# Patient Record
Sex: FEMALE | Race: WHITE | NOT HISPANIC OR LATINO | Employment: OTHER | URBAN - METROPOLITAN AREA
[De-identification: names, ages, dates, MRNs, and addresses within clinical notes are randomized per-mention and may not be internally consistent; named-entity substitution may affect disease eponyms.]

---

## 2017-08-09 ENCOUNTER — TELEPHONE (OUTPATIENT)
Dept: UROLOGY | Facility: CLINIC | Age: 67
End: 2017-08-09

## 2017-08-09 NOTE — TELEPHONE ENCOUNTER
Spoke to pt and we are going to get her records, she has a stent and a kidney stone. She was seeing dr mandujano and she wants to now see dr tejeda. She still has a stone that has not beed broken up. She had her surgery and imaging done at [facility]. Will get records.

## 2017-08-09 NOTE — TELEPHONE ENCOUNTER
----- Message from Izaiah Sade sent at 8/9/2017 10:52 AM CDT -----  Contact: Patient  Patient states that she had made an appointment with Dr London for August 25th but there was a procedure that was going to be done by another doctor and it was scheduled for tomorrow..  It was postponed.  The patient would like to be seen sooner.   Can you please call the patient back at [phone].  Thank you

## 2017-08-23 ENCOUNTER — TELEPHONE (OUTPATIENT)
Dept: UROLOGY | Facility: CLINIC | Age: 67
End: 2017-08-23

## 2017-08-23 RX ORDER — LANCETS
EACH MISCELLANEOUS
Refills: 6 | COMMUNITY
Start: 2017-08-16 | End: 2023-09-15

## 2017-08-23 NOTE — TELEPHONE ENCOUNTER
----- Message from Roshni Dunaway sent at 8/23/2017 12:47 PM CDT -----  Reschedule appointment.  Call [phone].

## 2017-08-24 ENCOUNTER — TELEPHONE (OUTPATIENT)
Dept: UROLOGY | Facility: CLINIC | Age: 67
End: 2017-08-24

## 2017-08-24 NOTE — TELEPHONE ENCOUNTER
Patient called to confirm an appointment with Dr. London. She would like to be on a cancellation list.

## 2017-09-07 ENCOUNTER — OFFICE VISIT (OUTPATIENT)
Dept: UROLOGY | Facility: CLINIC | Age: 67
End: 2017-09-07
Payer: MEDICARE

## 2017-09-07 ENCOUNTER — HOSPITAL ENCOUNTER (OUTPATIENT)
Dept: RADIOLOGY | Facility: HOSPITAL | Age: 67
Discharge: HOME | End: 2017-09-07
Attending: UROLOGY
Payer: MEDICARE

## 2017-09-07 VITALS
BODY MASS INDEX: 26.08 KG/M2 | WEIGHT: 162.25 LBS | SYSTOLIC BLOOD PRESSURE: 130 MMHG | DIASTOLIC BLOOD PRESSURE: 74 MMHG | HEIGHT: 66 IN | HEART RATE: 88 BPM

## 2017-09-07 DIAGNOSIS — N20.1 URETERAL STONE: ICD-10-CM

## 2017-09-07 DIAGNOSIS — N20.0 RENAL STONE: Primary | ICD-10-CM

## 2017-09-07 DIAGNOSIS — N20.0 RENAL STONE: ICD-10-CM

## 2017-09-07 DIAGNOSIS — R31.29 HEMATURIA, MICROSCOPIC: ICD-10-CM

## 2017-09-07 LAB
BILIRUB SERPL-MCNC: NEGATIVE MG/DL
BLOOD URINE, POC: 50
COLOR, POC UA: YELLOW
GLUCOSE UR QL STRIP: 100
KETONES UR QL STRIP: NEGATIVE
LEUKOCYTE ESTERASE URINE, POC: NORMAL
NITRITE, POC UA: NEGATIVE
PH, POC UA: 5
PROTEIN, POC: NORMAL
SPECIFIC GRAVITY, POC UA: 1.01
UROBILINOGEN, POC UA: NEGATIVE

## 2017-09-07 PROCEDURE — 99999 PR PBB SHADOW E&M-EST. PATIENT-LVL III: CPT | Mod: PBBFAC,,, | Performed by: UROLOGY

## 2017-09-07 PROCEDURE — 99213 OFFICE O/P EST LOW 20 MIN: CPT | Mod: PBBFAC,25,PO | Performed by: UROLOGY

## 2017-09-07 PROCEDURE — 81002 URINALYSIS NONAUTO W/O SCOPE: CPT | Mod: PBBFAC,PO | Performed by: UROLOGY

## 2017-09-07 PROCEDURE — 1126F AMNT PAIN NOTED NONE PRSNT: CPT | Mod: ,,, | Performed by: UROLOGY

## 2017-09-07 PROCEDURE — 74000 XR ABDOMEN AP 1 VIEW: CPT | Mod: 26,,, | Performed by: RADIOLOGY

## 2017-09-07 PROCEDURE — 99204 OFFICE O/P NEW MOD 45 MIN: CPT | Mod: S$PBB,,, | Performed by: UROLOGY

## 2017-09-07 PROCEDURE — 1159F MED LIST DOCD IN RCRD: CPT | Mod: ,,, | Performed by: UROLOGY

## 2017-09-07 PROCEDURE — 74000 XR ABDOMEN AP 1 VIEW: CPT | Mod: TC,PO

## 2017-09-07 RX ORDER — CETIRIZINE HYDROCHLORIDE 10 MG/1
10 TABLET ORAL DAILY PRN
COMMUNITY
End: 2017-09-11

## 2017-09-07 RX ORDER — METFORMIN HYDROCHLORIDE 1000 MG/1
1000 TABLET ORAL 2 TIMES DAILY
COMMUNITY
Start: 2017-06-28 | End: 2023-09-15

## 2017-09-07 RX ORDER — ALBUTEROL SULFATE 90 UG/1
2 AEROSOL, METERED RESPIRATORY (INHALATION) EVERY 6 HOURS PRN
COMMUNITY
Start: 2015-02-03

## 2017-09-07 RX ORDER — MULTIVITAMIN
1 TABLET ORAL DAILY
COMMUNITY
End: 2023-09-15

## 2017-09-07 RX ORDER — GLIPIZIDE 10 MG/1
10 TABLET ORAL DAILY
COMMUNITY
Start: 2017-08-15 | End: 2023-09-15

## 2017-09-07 RX ORDER — LANCETS
1 EACH MISCELLANEOUS 2 TIMES DAILY
COMMUNITY
Start: 2017-06-26 | End: 2023-09-15

## 2017-09-07 NOTE — PROGRESS NOTES
Subjective:       Patient ID: Aida Virk is a 67 y.o. female.    Chief Complaint: No chief complaint on file.    HPI     67 year old with a left kidney/ureteral stone.  She was initially seen in the ER at [facility] with left flank pain with associated nausea and fever.  She was seen by Dr. Elias and a stent was placed on 6/24/17 and she was treated with antibiotics.  She then underwent Left ESWL on 7/19.  She is here is [facility] care.  She still has the stent.  She has no complaints today.  She denies hematuria and dysuria.  This was her first lifetime stone.  She has no recent imaging.    History reviewed. No pertinent past medical history.    History reviewed. No pertinent surgical history.      Current Outpatient Prescriptions:     ACCU-CHEK SOFTCLIX LANCETS Misc, TEST TID UTD, Disp: , Rfl: 6    albuterol 90 mcg/actuation inhaler, Inhale 2 puffs into the lungs every 6 (six) hours as needed., Disp: , Rfl:     blood sugar diagnostic (ACCU-CHEK RUSTY PLUS TEST STRP) Strp, 1 strip 3 (three) times daily., Disp: , Rfl:     cetirizine (ZYRTEC) 10 MG tablet, Take 10 mg by mouth daily as needed for Allergies., Disp: , Rfl:     glipiZIDE (GLUCOTROL) 10 MG tablet, Take 10 mg by mouth once daily., Disp: , Rfl:     lancets (ACCU-CHEK SOFTCLIX LANCETS) Misc, 1 each by Other route 2 (two) times daily., Disp: , Rfl:     metformin (GLUCOPHAGE) 1000 MG tablet, Take 1,000 mg by mouth 2 (two) times daily., Disp: , Rfl:     multivitamin (ONE DAILY MULTIVITAMIN) per tablet, Take 1 tablet by mouth once daily., Disp: , Rfl:     Review of Systems   Constitutional: Negative for fever.   Eyes: Negative for visual disturbance.   Respiratory: Negative for shortness of breath.    Cardiovascular: Negative for chest pain.   Gastrointestinal: Negative for nausea.   Genitourinary: Negative for dysuria, flank pain and hematuria.   Musculoskeletal: Negative for gait problem.   Skin: Negative for rash.   Neurological: Negative for  seizures.   Psychiatric/Behavioral: Negative for confusion.       Objective:      Physical Exam   Constitutional: She is oriented to person, place, and time. She appears well-developed and well-nourished.   HENT:   Head: Normocephalic.   Eyes: Conjunctivae and EOM are normal.   Neck: Normal range of motion.   Cardiovascular: Normal rate.    Pulmonary/Chest: Effort normal.   Abdominal: Soft. She exhibits no distension and no mass. There is no tenderness. There is no CVA tenderness.   Genitourinary:   Genitourinary Comments: Bladder non-tender and nondistended  No CVA tenderness   Musculoskeletal: She exhibits no edema.   Neurological: She is alert and oriented to person, place, and time.   Skin: Skin is warm and dry. No rash noted. No erythema.   Psychiatric: She has a normal mood and affect. Her behavior is normal.   Vitals reviewed.      Assessment:       1. Renal stone    2. Ureteral stone    3. Hematuria, microscopic        Plan:       Renal stone  -     POCT URINE DIPSTICK WITHOUT MICROSCOPE  -     X-Ray Abdomen AP 1 View; Future; Expected date: 09/07/2017    Ureteral stone    Hematuria, microscopic        I reviewed the KUB.  There is a large calcification over the left renal shadow which is a gallstone seen on CT scan.  Another calcification is seen along the distal stent which I think corresponds to a phlebolith seen on CT scan although difficult to know for sure.  I recommend removing stent and observation.

## 2017-09-11 ENCOUNTER — PROCEDURE VISIT (OUTPATIENT)
Dept: UROLOGY | Facility: CLINIC | Age: 67
End: 2017-09-11
Payer: MEDICARE

## 2017-09-11 ENCOUNTER — TELEPHONE (OUTPATIENT)
Dept: UROLOGY | Facility: CLINIC | Age: 67
End: 2017-09-11

## 2017-09-11 VITALS
HEIGHT: 66 IN | BODY MASS INDEX: 25.86 KG/M2 | DIASTOLIC BLOOD PRESSURE: 75 MMHG | HEART RATE: 87 BPM | WEIGHT: 160.94 LBS | SYSTOLIC BLOOD PRESSURE: 121 MMHG

## 2017-09-11 DIAGNOSIS — N20.1 URETERAL STONE: ICD-10-CM

## 2017-09-11 DIAGNOSIS — N20.0 KIDNEY STONE: ICD-10-CM

## 2017-09-11 DIAGNOSIS — R31.9 HEMATURIA: ICD-10-CM

## 2017-09-11 DIAGNOSIS — N20.0 KIDNEY STONE: Primary | ICD-10-CM

## 2017-09-11 LAB
BILIRUB SERPL-MCNC: ABNORMAL MG/DL
BLOOD URINE, POC: ABNORMAL
COLOR, POC UA: YELLOW
GLUCOSE UR QL STRIP: 100
KETONES UR QL STRIP: ABNORMAL
LEUKOCYTE ESTERASE URINE, POC: ABNORMAL
NITRITE, POC UA: ABNORMAL
PH, POC UA: 5
PROTEIN, POC: ABNORMAL
SPECIFIC GRAVITY, POC UA: 1
UROBILINOGEN, POC UA: ABNORMAL

## 2017-09-11 PROCEDURE — 52000 CYSTOURETHROSCOPY: CPT | Mod: PBBFAC,PO | Performed by: UROLOGY

## 2017-09-11 PROCEDURE — 81002 URINALYSIS NONAUTO W/O SCOPE: CPT | Mod: PBBFAC,PO | Performed by: UROLOGY

## 2017-09-11 RX ORDER — FLUTICASONE PROPIONATE AND SALMETEROL 250; 50 UG/1; UG/1
1 POWDER RESPIRATORY (INHALATION)
Status: ON HOLD | COMMUNITY
Start: 2015-02-03 | End: 2017-10-03 | Stop reason: CLARIF

## 2017-09-11 RX ORDER — CLOBETASOL PROPIONATE 0.5 MG/G
CREAM TOPICAL
Status: ON HOLD | COMMUNITY
Start: 2014-12-18 | End: 2017-10-03 | Stop reason: CLARIF

## 2017-09-11 RX ORDER — CETIRIZINE HYDROCHLORIDE 10 MG/1
10 TABLET, CHEWABLE ORAL
COMMUNITY
End: 2017-09-11

## 2017-09-11 NOTE — PROGRESS NOTES
Scheduled on 10/3/17 in [facility] for Cysto, Right Retrogrades, Right Ureteroscopy, Stent Retrieval & Possible Holmium Laser of stone.

## 2017-09-11 NOTE — TELEPHONE ENCOUNTER
Spoke to pt and confirmed adding her for today for the stent removal here in the office. She verbalized understanding.

## 2017-09-11 NOTE — PROCEDURES
"Cystoscopy  Date/Time: 9/11/2017 4:53 PM  Performed by: JUAN JOSÉ PAIGE  Authorized by: JUAN JOSÉ PAIGE     Consent Done?:  Yes (Written)  Time out: Immediately prior to procedure a "time out" was called to verify the correct patient, procedure, equipment, support staff and site/side marked as required.    Indications comment:  History of kidney stones  Position:  Other  Anesthesia:  Lidocaine jelly  Patient sedated?: No    Preparation: Patient was prepped and draped in usual sterile fashion      Scope type:  Flexible cystoscope    Patient tolerance:  Patient tolerated the procedure well with no immediate complications        The patients clinic history and physical were reviewed and there are no changes.      Procedure  Cystoscopy with attempted stent removal    The patient was placed in the frog-leg position.  The genitals were prepped and draped sterily.   Viscous lidocaine jelly was instilled into the urethra.  Using a flexible scope, a careful cystoscopic, exam was then performed.  The entire bladder mucosa was systematically visualized.  The mucosa appeared normal.   The right ureteral orifices was visualized.  The distal curl of the stent was not seen as it had retracted into the ureter.  The grasping forceps into the distal ureter but I was unable to identify and grasp the stent.  The procedure was then termintated.  She tolerated the procedure well.  There were no complications.    Plan:  Ureteroscopy with stent removal.    "

## 2017-09-22 DIAGNOSIS — N20.0 KIDNEY STONES: Primary | ICD-10-CM

## 2017-09-27 ENCOUNTER — TELEPHONE (OUTPATIENT)
Dept: UROLOGY | Facility: CLINIC | Age: 67
End: 2017-09-27

## 2017-09-27 RX ORDER — HYDROCODONE BITARTRATE AND ACETAMINOPHEN 5; 325 MG/1; MG/1
1 TABLET ORAL EVERY 6 HOURS PRN
Qty: 20 TABLET | Refills: 0 | Status: SHIPPED | OUTPATIENT
Start: 2017-09-27 | End: 2023-09-15

## 2017-09-27 NOTE — TELEPHONE ENCOUNTER
Spoke to pt and she is having pain that the tylenol is not helping. Pt would like to be prescribed a pain medication. Please send to [facility].

## 2017-09-27 NOTE — TELEPHONE ENCOUNTER
----- Message from Emil Morales sent at 9/27/2017  4:00 PM CDT -----  Contact: patient  Place call to pod,patient called stated that she has issues with kidneys in pain.don't know if she can wait for surgery.requesting a call back at [phone]. Thanks,

## 2017-10-02 ENCOUNTER — ANESTHESIA EVENT (OUTPATIENT)
Dept: SURGERY | Facility: HOSPITAL | Age: 67
End: 2017-10-02
Payer: MEDICARE

## 2017-10-03 ENCOUNTER — SURGERY (OUTPATIENT)
Age: 67
End: 2017-10-03

## 2017-10-03 ENCOUNTER — HOSPITAL ENCOUNTER (OUTPATIENT)
Dept: RADIOLOGY | Facility: HOSPITAL | Age: 67
Discharge: HOME | End: 2017-10-03
Attending: UROLOGY | Admitting: UROLOGY
Payer: MEDICARE

## 2017-10-03 ENCOUNTER — TELEPHONE (OUTPATIENT)
Dept: UROLOGY | Facility: CLINIC | Age: 67
End: 2017-10-03

## 2017-10-03 ENCOUNTER — ANESTHESIA (OUTPATIENT)
Dept: SURGERY | Facility: HOSPITAL | Age: 67
End: 2017-10-03
Payer: MEDICARE

## 2017-10-03 ENCOUNTER — HOSPITAL ENCOUNTER (OUTPATIENT)
Facility: HOSPITAL | Age: 67
Discharge: HOME | End: 2017-10-03
Attending: UROLOGY | Admitting: UROLOGY
Payer: MEDICARE

## 2017-10-03 VITALS
TEMPERATURE: 98 F | DIASTOLIC BLOOD PRESSURE: 72 MMHG | SYSTOLIC BLOOD PRESSURE: 113 MMHG | HEIGHT: 66 IN | BODY MASS INDEX: 24.91 KG/M2 | RESPIRATION RATE: 18 BRPM | WEIGHT: 155 LBS | OXYGEN SATURATION: 98 % | HEART RATE: 84 BPM

## 2017-10-03 DIAGNOSIS — N20.1 RIGHT URETERAL STONE: Primary | ICD-10-CM

## 2017-10-03 DIAGNOSIS — N20.0 KIDNEY STONES: ICD-10-CM

## 2017-10-03 DIAGNOSIS — N20.1 URETERAL STONE: ICD-10-CM

## 2017-10-03 DIAGNOSIS — N20.0 KIDNEY STONE: ICD-10-CM

## 2017-10-03 LAB — GLUCOSE SERPL-MCNC: 177 MG/DL (ref 70–110)

## 2017-10-03 PROCEDURE — 36000706: Mod: PO | Performed by: UROLOGY

## 2017-10-03 PROCEDURE — 82962 GLUCOSE BLOOD TEST: CPT | Mod: PO | Performed by: UROLOGY

## 2017-10-03 PROCEDURE — 25000003 PHARM REV CODE 250: Mod: PO | Performed by: NURSE ANESTHETIST, CERTIFIED REGISTERED

## 2017-10-03 PROCEDURE — 27200651 HC AIRWAY, LMA: Mod: PO | Performed by: NURSE ANESTHETIST, CERTIFIED REGISTERED

## 2017-10-03 PROCEDURE — D9220A PRA ANESTHESIA: Mod: ANES,,, | Performed by: ANESTHESIOLOGY

## 2017-10-03 PROCEDURE — 63600175 PHARM REV CODE 636 W HCPCS: Mod: PO | Performed by: UROLOGY

## 2017-10-03 PROCEDURE — D9220A PRA ANESTHESIA: Mod: CRNA,,, | Performed by: NURSE ANESTHETIST, CERTIFIED REGISTERED

## 2017-10-03 PROCEDURE — 25000242 PHARM REV CODE 250 ALT 637 W/ HCPCS: Mod: PO | Performed by: ANESTHESIOLOGY

## 2017-10-03 PROCEDURE — 25000003 PHARM REV CODE 250: Mod: PO | Performed by: ANESTHESIOLOGY

## 2017-10-03 PROCEDURE — 76000 FLUOROSCOPY <1 HR PHYS/QHP: CPT | Mod: TC,PO

## 2017-10-03 PROCEDURE — 76000 FLUOROSCOPY <1 HR PHYS/QHP: CPT | Mod: 26,59,, | Performed by: UROLOGY

## 2017-10-03 PROCEDURE — 82365 CALCULUS SPECTROSCOPY: CPT

## 2017-10-03 PROCEDURE — C2617 STENT, NON-COR, TEM W/O DEL: HCPCS | Mod: PO | Performed by: UROLOGY

## 2017-10-03 PROCEDURE — 63600175 PHARM REV CODE 636 W HCPCS: Mod: PO | Performed by: NURSE ANESTHETIST, CERTIFIED REGISTERED

## 2017-10-03 PROCEDURE — 36000707: Mod: PO | Performed by: UROLOGY

## 2017-10-03 PROCEDURE — C1758 CATHETER, URETERAL: HCPCS | Mod: PO | Performed by: UROLOGY

## 2017-10-03 PROCEDURE — 37000008 HC ANESTHESIA 1ST 15 MINUTES: Mod: PO | Performed by: UROLOGY

## 2017-10-03 PROCEDURE — 63600175 PHARM REV CODE 636 W HCPCS: Mod: PO | Performed by: ANESTHESIOLOGY

## 2017-10-03 PROCEDURE — 27201423 OPTIME MED/SURG SUP & DEVICES STERILE SUPPLY: Mod: PO | Performed by: UROLOGY

## 2017-10-03 PROCEDURE — 25000003 PHARM REV CODE 250: Mod: PO | Performed by: UROLOGY

## 2017-10-03 PROCEDURE — 37000009 HC ANESTHESIA EA ADD 15 MINS: Mod: PO | Performed by: UROLOGY

## 2017-10-03 PROCEDURE — 52356 CYSTO/URETERO W/LITHOTRIPSY: CPT | Mod: RT,,, | Performed by: UROLOGY

## 2017-10-03 PROCEDURE — 71000033 HC RECOVERY, INTIAL HOUR: Mod: PO | Performed by: UROLOGY

## 2017-10-03 PROCEDURE — 25500020 PHARM REV CODE 255: Mod: PO | Performed by: UROLOGY

## 2017-10-03 PROCEDURE — 74420 UROGRAPHY RTRGR +-KUB: CPT | Mod: 26,,, | Performed by: UROLOGY

## 2017-10-03 PROCEDURE — C1769 GUIDE WIRE: HCPCS | Mod: PO | Performed by: UROLOGY

## 2017-10-03 DEVICE — STENT URETERAL UNIV 6FR 24CM: Type: IMPLANTABLE DEVICE | Site: URETER | Status: FUNCTIONAL

## 2017-10-03 RX ORDER — MIDAZOLAM HYDROCHLORIDE 1 MG/ML
INJECTION, SOLUTION INTRAMUSCULAR; INTRAVENOUS
Status: DISCONTINUED | OUTPATIENT
Start: 2017-10-03 | End: 2017-10-03

## 2017-10-03 RX ORDER — SODIUM CHLORIDE, SODIUM LACTATE, POTASSIUM CHLORIDE, CALCIUM CHLORIDE 600; 310; 30; 20 MG/100ML; MG/100ML; MG/100ML; MG/100ML
INJECTION, SOLUTION INTRAVENOUS CONTINUOUS
Status: DISCONTINUED | OUTPATIENT
Start: 2017-10-03 | End: 2017-10-03 | Stop reason: HOSPADM

## 2017-10-03 RX ORDER — LIDOCAINE HYDROCHLORIDE 10 MG/ML
1 INJECTION, SOLUTION EPIDURAL; INFILTRATION; INTRACAUDAL; PERINEURAL ONCE
Status: DISCONTINUED | OUTPATIENT
Start: 2017-10-03 | End: 2017-10-03 | Stop reason: HOSPADM

## 2017-10-03 RX ORDER — LEVALBUTEROL 1.25 MG/.5ML
1.25 SOLUTION, CONCENTRATE RESPIRATORY (INHALATION) ONCE
Status: DISCONTINUED | OUTPATIENT
Start: 2017-10-03 | End: 2017-10-03 | Stop reason: HOSPADM

## 2017-10-03 RX ORDER — LEVALBUTEROL 1.25 MG/.5ML
1.25 SOLUTION, CONCENTRATE RESPIRATORY (INHALATION) ONCE
Status: COMPLETED | OUTPATIENT
Start: 2017-10-03 | End: 2017-10-03

## 2017-10-03 RX ORDER — KETAMINE HYDROCHLORIDE 100 MG/ML
INJECTION, SOLUTION INTRAMUSCULAR; INTRAVENOUS
Status: DISCONTINUED | OUTPATIENT
Start: 2017-10-03 | End: 2017-10-03

## 2017-10-03 RX ORDER — SODIUM CHLORIDE, SODIUM LACTATE, POTASSIUM CHLORIDE, CALCIUM CHLORIDE 600; 310; 30; 20 MG/100ML; MG/100ML; MG/100ML; MG/100ML
10 INJECTION, SOLUTION INTRAVENOUS CONTINUOUS
Status: DISCONTINUED | OUTPATIENT
Start: 2017-10-04 | End: 2017-10-03 | Stop reason: HOSPADM

## 2017-10-03 RX ORDER — SODIUM CHLORIDE 9 MG/ML
INJECTION, SOLUTION INTRAVENOUS CONTINUOUS
Status: DISCONTINUED | OUTPATIENT
Start: 2017-10-03 | End: 2017-10-03

## 2017-10-03 RX ORDER — ONDANSETRON 2 MG/ML
4 INJECTION INTRAMUSCULAR; INTRAVENOUS ONCE AS NEEDED
Status: DISCONTINUED | OUTPATIENT
Start: 2017-10-03 | End: 2017-10-03 | Stop reason: HOSPADM

## 2017-10-03 RX ORDER — FENTANYL CITRATE 50 UG/ML
INJECTION, SOLUTION INTRAMUSCULAR; INTRAVENOUS
Status: DISCONTINUED | OUTPATIENT
Start: 2017-10-03 | End: 2017-10-03

## 2017-10-03 RX ORDER — FENTANYL CITRATE 50 UG/ML
25 INJECTION, SOLUTION INTRAMUSCULAR; INTRAVENOUS EVERY 5 MIN PRN
Status: DISCONTINUED | OUTPATIENT
Start: 2017-10-03 | End: 2017-10-03 | Stop reason: HOSPADM

## 2017-10-03 RX ORDER — CIPROFLOXACIN 500 MG/1
500 TABLET ORAL 2 TIMES DAILY
Qty: 10 TABLET | Refills: 0 | Status: SHIPPED | OUTPATIENT
Start: 2017-10-03 | End: 2017-10-08

## 2017-10-03 RX ORDER — DEXAMETHASONE SODIUM PHOSPHATE 4 MG/ML
8 INJECTION, SOLUTION INTRA-ARTICULAR; INTRALESIONAL; INTRAMUSCULAR; INTRAVENOUS; SOFT TISSUE
Status: COMPLETED | OUTPATIENT
Start: 2017-10-03 | End: 2017-10-03

## 2017-10-03 RX ORDER — ACETAMINOPHEN 10 MG/ML
INJECTION, SOLUTION INTRAVENOUS
Status: DISCONTINUED | OUTPATIENT
Start: 2017-10-03 | End: 2017-10-03

## 2017-10-03 RX ORDER — ONDANSETRON 2 MG/ML
INJECTION INTRAMUSCULAR; INTRAVENOUS
Status: DISCONTINUED | OUTPATIENT
Start: 2017-10-03 | End: 2017-10-03

## 2017-10-03 RX ORDER — PROPOFOL 10 MG/ML
VIAL (ML) INTRAVENOUS
Status: DISCONTINUED | OUTPATIENT
Start: 2017-10-03 | End: 2017-10-03

## 2017-10-03 RX ORDER — LIDOCAINE HYDROCHLORIDE 10 MG/ML
1 INJECTION, SOLUTION EPIDURAL; INFILTRATION; INTRACAUDAL; PERINEURAL ONCE AS NEEDED
Status: DISCONTINUED | OUTPATIENT
Start: 2017-10-03 | End: 2017-10-03 | Stop reason: HOSPADM

## 2017-10-03 RX ORDER — LIDOCAINE HCL/PF 100 MG/5ML
SYRINGE (ML) INTRAVENOUS
Status: DISCONTINUED | OUTPATIENT
Start: 2017-10-03 | End: 2017-10-03

## 2017-10-03 RX ORDER — SODIUM CHLORIDE 0.9 % (FLUSH) 0.9 %
3 SYRINGE (ML) INJECTION
Status: DISCONTINUED | OUTPATIENT
Start: 2017-10-03 | End: 2017-10-03 | Stop reason: HOSPADM

## 2017-10-03 RX ORDER — OXYCODONE HYDROCHLORIDE 5 MG/1
5 TABLET ORAL ONCE AS NEEDED
Status: DISCONTINUED | OUTPATIENT
Start: 2017-10-03 | End: 2017-10-03 | Stop reason: HOSPADM

## 2017-10-03 RX ORDER — LIDOCAINE HYDROCHLORIDE 10 MG/ML
1 INJECTION, SOLUTION EPIDURAL; INFILTRATION; INTRACAUDAL; PERINEURAL
Status: DISCONTINUED | OUTPATIENT
Start: 2017-10-03 | End: 2017-10-03 | Stop reason: HOSPADM

## 2017-10-03 RX ORDER — PHENYLEPHRINE HYDROCHLORIDE 10 MG/ML
INJECTION INTRAVENOUS
Status: DISCONTINUED | OUTPATIENT
Start: 2017-10-03 | End: 2017-10-03

## 2017-10-03 RX ADMIN — FENTANYL CITRATE 50 MCG: 50 INJECTION, SOLUTION INTRAMUSCULAR; INTRAVENOUS at 07:10

## 2017-10-03 RX ADMIN — PHENYLEPHRINE HYDROCHLORIDE 50 MCG: 10 INJECTION, SOLUTION INTRAMUSCULAR; INTRAVENOUS; SUBCUTANEOUS at 07:10

## 2017-10-03 RX ADMIN — LEVALBUTEROL 1.25 MG: 1.25 SOLUTION, CONCENTRATE RESPIRATORY (INHALATION) at 06:10

## 2017-10-03 RX ADMIN — ACETAMINOPHEN 1000 MG: 10 INJECTION, SOLUTION INTRAVENOUS at 07:10

## 2017-10-03 RX ADMIN — MIDAZOLAM HYDROCHLORIDE 2 MG: 1 INJECTION, SOLUTION INTRAMUSCULAR; INTRAVENOUS at 07:10

## 2017-10-03 RX ADMIN — GENTAMICIN SULFATE 80 MG: 40 INJECTION, SOLUTION INTRAMUSCULAR; INTRAVENOUS at 07:10

## 2017-10-03 RX ADMIN — KETAMINE HYDROCHLORIDE 25 MG: 100 INJECTION, SOLUTION, CONCENTRATE INTRAMUSCULAR; INTRAVENOUS at 07:10

## 2017-10-03 RX ADMIN — SODIUM CHLORIDE, SODIUM LACTATE, POTASSIUM CHLORIDE, AND CALCIUM CHLORIDE: 600; 310; 30; 20 INJECTION, SOLUTION INTRAVENOUS at 06:10

## 2017-10-03 RX ADMIN — LIDOCAINE HYDROCHLORIDE 100 MG: 20 INJECTION PARENTERAL at 07:10

## 2017-10-03 RX ADMIN — PROPOFOL 150 MG: 10 INJECTION, EMULSION INTRAVENOUS at 07:10

## 2017-10-03 RX ADMIN — ONDANSETRON 4 MG: 2 INJECTION, SOLUTION INTRAMUSCULAR; INTRAVENOUS at 08:10

## 2017-10-03 RX ADMIN — IOHEXOL 10 ML: 180 INJECTION INTRAVENOUS at 08:10

## 2017-10-03 RX ADMIN — DEXAMETHASONE SODIUM PHOSPHATE 8 MG: 4 INJECTION, SOLUTION INTRAMUSCULAR; INTRAVENOUS at 06:10

## 2017-10-03 NOTE — DISCHARGE INSTRUCTIONS
Discharge Instructions: After Your Surgery  Youve just had surgery. During surgery, you were given medicine called anesthesia to keep you relaxed and free of pain. After surgery, you may have some pain or nausea. This is common. Here are some tips for feeling better and getting well after surgery.     Stay on schedule with your medicine.   Going home  Your healthcare provider will show you how to take care of yourself when you go home. He or she will also answer your questions. Have an adult family member or friend drive you home. For the first 24 hours after your surgery:  · Do not drive or use heavy equipment.  · Do not make important decisions or sign legal papers.  · Do not drink alcohol.  · Have someone stay with you, if needed. He or she can watch for problems and help keep you safe.  Be sure to go to all follow-up visits with your healthcare provider. And rest after your surgery for as long as your healthcare provider tells you to.  Coping with pain  If you have pain after surgery, pain medicine will help you feel better. Take it as told, before pain becomes severe. Also, ask your healthcare provider or pharmacist about other ways to control pain. This might be with heat, ice, or relaxation. And follow any other instructions your surgeon or nurse gives you.  Tips for taking pain medicine  To get the best relief possible, remember these points:  · Pain medicines can upset your stomach. Taking them with a little food may help.  · Most pain relievers taken by mouth need at least 20 to 30 minutes to start to work.  · Taking medicine on a schedule can help you remember to take it. Try to time your medicine so that you can take it before starting an activity. This might be before you get dressed, go for a walk, or sit down for dinner.  · Constipation is a common side effect of pain medicines. Call your healthcare provider before taking any medicines such as laxatives or stool softeners to help ease constipation.  Also ask if you should skip any foods. Drinking lots of fluids and eating foods such as fruits and vegetables that are high in fiber can also help. Remember, do not take laxatives unless your surgeon has prescribed them.  · Drinking alcohol and taking pain medicine can cause dizziness and slow your breathing. It can even be deadly. Do not drink alcohol while taking pain medicine.  · Pain medicine can make you react more slowly to things. Do not drive or run machinery while taking pain medicine.  Your healthcare provider may tell you to take acetaminophen to help ease your pain. Ask him or her how much you are supposed to take each day. Acetaminophen or other pain relievers may interact with your prescription medicines or other over-the-counter (OTC) medicines. Some prescription medicines have acetaminophen and other ingredients. Using both prescription and OTC acetaminophen for pain can cause you to overdose. Read the labels on your OTC medicines with care. This will help you to clearly know the list of ingredients, how much to take, and any warnings. It may also help you not take too much acetaminophen. If you have questions or do not understand the information, ask your pharmacist or healthcare provider to explain it to you before you take the OTC medicine.  Managing nausea  Some people have an upset stomach after surgery. This is often because of anesthesia, pain, or pain medicine, or the stress of surgery. These tips will help you handle nausea and eat healthy foods as you get better. If you were on a special food plan before surgery, ask your healthcare provider if you should follow it while you get better. These tips may help:  · Do not push yourself to eat. Your body will tell you when to eat and how much.  · Start off with clear liquids and soup. They are easier to digest.  · Next try semi-solid foods, such as mashed potatoes, applesauce, and gelatin, as you feel ready.  · Slowly move to solid foods. Dont  eat fatty, rich, or spicy foods at first.  · Do not force yourself to have 3 large meals a day. Instead eat smaller amounts more often.  · Take pain medicines with a small amount of solid food, such as crackers or toast, to avoid nausea.     Call your surgeon if  · You still have pain an hour after taking medicine. The medicine may not be strong enough.  · You feel too sleepy, dizzy, or groggy. The medicine may be too strong.  · You have side effects like nausea, vomiting, or skin changes, such as rash, itching, or hives.       If you have obstructive sleep apnea  You were given anesthesia medicine during surgery to keep you comfortable and free of pain. After surgery, you may have more apnea spells because of this medicine and other medicines you were given. The spells may last longer than usual.   At home:  · Keep using the continuous positive airway pressure (CPAP) device when you sleep. Unless your healthcare provider tells you not to, use it when you sleep, day or night. CPAP is a common device used to treat obstructive sleep apnea.  · Talk with your provider before taking any pain medicine, muscle relaxants, or sedatives. Your provider will tell you about the possible dangers of taking these medicines.  Date Last Reviewed: 12/1/2016 © 2000-2017 [employer]. [address] [place] [address]. All rights reserved. This information is not intended as a substitute for professional medical care. Always follow your healthcare professional's instructions.      CYSTOSCOPY  After surgery:  DOS:   Minimal activity for first 24 hours.   Advance diet as tolerated.   Drink a lot of liquids until you see your doctor.   Resume home medications_________________    DONT:   No driving for 24 hours or while taking narcotic pain medications   DO NOT TAKE ADDITIONAL TYLENOL/ACETAMINOPHEN WHILE TAKING NARCOTIC PAIN MEDICATION THAT CONTAINS TYLENOL/ACETAMINOPHEN.    CALL PHYSICIAN FOR:   Unable to  urinate within 6 hours after surgery.   Fever> 101   Pain unrelieved by pain medication   Blood in the urine with clots.    MAKE RETURN APPOINTMENT FOR: ___________________________________    FOR EMERGENCIES CONTACT ____[facility]____________AT [phone]  Reviewed and revised per Dr. Guthrie and Dr. Khan May 2014

## 2017-10-03 NOTE — ANESTHESIA POSTPROCEDURE EVALUATION
"Anesthesia Post Evaluation    Patient: Aida Virk    Procedure(s) Performed: Procedure(s) (LRB):  CYSTOSCOPY WITH RETROGRADE PYELOGRAM (Right)  REMOVAL-STENT-URETERAL (Right)  LITHOTRIPSY-LASER (Right)  REMOVAL-STONE-URETERAL (Right)  CYSTOSCOPY WITH STENT PLACEMENT (Right)    Final Anesthesia Type: general  Patient location during evaluation: PACU  Patient participation: Yes- Able to Participate  Level of consciousness: awake and alert  Post-procedure vital signs: reviewed and stable  Pain management: adequate  Airway patency: patent  PONV status at discharge: No PONV  Anesthetic complications: no      Cardiovascular status: blood pressure returned to baseline and hemodynamically stable  Respiratory status: unassisted  Hydration status: euvolemic  Follow-up not needed.        Visit Vitals  /64 (BP Location: Left arm, Patient Position: Lying)   Pulse 84   Temp 36.5 °C (97.7 °F) (Skin)   Resp 18   Ht 5' 6" (1.676 m)   Wt 70.3 kg (155 lb)   SpO2 97%   Breastfeeding? No   BMI 25.02 kg/m²       Pain/Genaro Score: Pain Assessment Performed: Yes (10/3/2017  8:25 AM)  Presence of Pain: non-verbal indicators absent (10/3/2017  8:25 AM)  Genaro Score: 6 (10/3/2017  8:25 AM)      "

## 2017-10-03 NOTE — ANESTHESIA PREPROCEDURE EVALUATION
"                                                                                                             10/03/2017  Aida Virk is a 67 y.o., female.    Anesthesia Evaluation    I have reviewed the Patient Summary Reports.    I have reviewed the Nursing Notes.      Review of Systems  Anesthesia Hx:  No problems with previous Anesthesia Polyps left nostril - "shallow breather" in PACU   Endocrine:   Diabetes, well controlled, type 2        Physical Exam  General:  Well nourished                 Anesthesia Plan  Type of Anesthesia, risks & benefits discussed:  Anesthesia Type:  general  Patient's Preference:   Intra-op Monitoring Plan:   Intra-op Monitoring Plan Comments:   Post Op Pain Control Plan:   Post Op Pain Control Plan Comments:   Induction:   IV  Beta Blocker:  Patient is not currently on a Beta-Blocker (No further documentation required).       Informed Consent: Patient understands risks and agrees with Anesthesia plan.  Questions answered. Anesthesia consent signed with patient.  ASA Score: 2     Day of Surgery Review of History & Physical:    H&P update referred to the surgeon.         Ready For Surgery From Anesthesia Perspective.       "

## 2017-10-03 NOTE — OP NOTE
DATE OF PROCEDURE:  10/03/2017    PREOPERATIVE DIAGNOSES:  Right ureteral stone, ectopic ureteral stent.    POSTOPERATIVE DIAGNOSES:  Right ureteral stone, ectopic ureteral stent.    OPERATIVE PROCEDURE:  Cystoscopy, right retrograde pyelogram, right ureteroscopy   with removal of ectopic stent, holmium laser lithotripsy of right ureteral   stone, basket extraction of stone fragments, right ureteral stent placement.    ANESTHESIA:  General.    COMPLICATIONS:  None.    SPECIMEN:  Right ureteral stone.    BLOOD LOSS:  None.    SURGEON:  Torrey London M.D.    ASSISTANT:  None.    INDICATIONS:  Ms. Virk is a 67-year-old female with a history of a stone.    She underwent shockwave lithotripsy and stent placement at an outside facility.    The stone and partially fragmented; however, there was a large fragment in the   mid ureter alongside the course of the stent.  Additionally, the stent had   migrated proximally into the distal ureter and was not visualized in the mid   bladder.  I recommended ureteroscopy and removal of the stone.    PROCEDURE IN DETAIL:  After informed consent was obtained, she was brought to   the operating room.  She was given preoperative antibiotics.  After adequate   general anesthesia was achieved, she was placed in the lithotomy position.  The   genitals were prepped and draped sterilely.  Rigid cystoscopy was then   performed.  The bladder mucosa was unremarkable.  I then turned my attention to   the right UO, which appeared normal.  On fluoroscopy, I could visualize the   stent and the stone.  I advanced a guidewire through the right UO into the   ureter beyond the stone and into the renal pelvis.  I then advanced a semirigid   ureteroscope until I was able to visualize the distal curl of the stent.  I used   a ZeroTip basket.  I was able to manipulate the basket around the stent,   grasped the stent and then I was able to remove it intact.  Once the stent was   removed, I advanced the  scope again into the distal to mid ureter.  I visualized   the stone.  A 365 micron holmium laser fiber was then placed through the   working channel of the scope and the stone was fragmented into multiple small   pieces.  A ZeroTip basket was used to sweep these fragments into the bladder.  I   few of the fragments were grasped and removed and sent for chemical analysis.    I then advanced the scope in the more proximal ureter.  No other significant   stone fragments were seen.  I then advanced an open-ended catheter over the wire   into the proximal ureter, injected contrast.  The renal pelvis was   unremarkable.  There were no filling defects, no evidence of any residual   stones.  There was a large calcification over the right kidney, but this is a   gallstone.  I then replaced the wire, removed the open-ended catheter and placed   a new 6-Yoruba 24 cm double-J stent over the wire.  The proximal curl was   positioned in the renal pelvis using fluoroscopy and the bladder on direct   cystoscopic visualization.  The bladder was then drained and all instruments   were removed.  The strings were left attached to the stent secured to the   patient's thigh.  She was repositioned supine.  She was awakened and transported   to the PACU in stable condition.      FREDI  dd: 10/03/2017 08:26:26 (CDT)  td: 10/03/2017 09:35:53 (CDT)  Doc ID   #[id number]  Job ID #[id number]    CC:

## 2017-10-03 NOTE — H&P (VIEW-ONLY)
Subjective:       Patient ID: Aida Virk is a 67 y.o. female.    Chief Complaint: No chief complaint on file.    HPI     67 year old with a left kidney/ureteral stone.  She was initially seen in the ER at [facility] with left flank pain with associated nausea and fever.  She was seen by Dr. Elias and a stent was placed on 6/24/17 and she was treated with antibiotics.  She then underwent Left ESWL on 7/19.  She is here is [facility] care.  She still has the stent.  She has no complaints today.  She denies hematuria and dysuria.  This was her first lifetime stone.  She has no recent imaging.    History reviewed. No pertinent past medical history.    History reviewed. No pertinent surgical history.      Current Outpatient Prescriptions:     ACCU-CHEK SOFTCLIX LANCETS Misc, TEST TID UTD, Disp: , Rfl: 6    albuterol 90 mcg/actuation inhaler, Inhale 2 puffs into the lungs every 6 (six) hours as needed., Disp: , Rfl:     blood sugar diagnostic (ACCU-CHEK RUSTY PLUS TEST STRP) Strp, 1 strip 3 (three) times daily., Disp: , Rfl:     cetirizine (ZYRTEC) 10 MG tablet, Take 10 mg by mouth daily as needed for Allergies., Disp: , Rfl:     glipiZIDE (GLUCOTROL) 10 MG tablet, Take 10 mg by mouth once daily., Disp: , Rfl:     lancets (ACCU-CHEK SOFTCLIX LANCETS) Misc, 1 each by Other route 2 (two) times daily., Disp: , Rfl:     metformin (GLUCOPHAGE) 1000 MG tablet, Take 1,000 mg by mouth 2 (two) times daily., Disp: , Rfl:     multivitamin (ONE DAILY MULTIVITAMIN) per tablet, Take 1 tablet by mouth once daily., Disp: , Rfl:     Review of Systems   Constitutional: Negative for fever.   Eyes: Negative for visual disturbance.   Respiratory: Negative for shortness of breath.    Cardiovascular: Negative for chest pain.   Gastrointestinal: Negative for nausea.   Genitourinary: Negative for dysuria, flank pain and hematuria.   Musculoskeletal: Negative for gait problem.   Skin: Negative for rash.   Neurological: Negative for  seizures.   Psychiatric/Behavioral: Negative for confusion.       Objective:      Physical Exam   Constitutional: She is oriented to person, place, and time. She appears well-developed and well-nourished.   HENT:   Head: Normocephalic.   Eyes: Conjunctivae and EOM are normal.   Neck: Normal range of motion.   Cardiovascular: Normal rate.    Pulmonary/Chest: Effort normal.   Abdominal: Soft. She exhibits no distension and no mass. There is no tenderness. There is no CVA tenderness.   Genitourinary:   Genitourinary Comments: Bladder non-tender and nondistended  No CVA tenderness   Musculoskeletal: She exhibits no edema.   Neurological: She is alert and oriented to person, place, and time.   Skin: Skin is warm and dry. No rash noted. No erythema.   Psychiatric: She has a normal mood and affect. Her behavior is normal.   Vitals reviewed.      Assessment:       1. Renal stone    2. Ureteral stone    3. Hematuria, microscopic        Plan:       Renal stone  -     POCT URINE DIPSTICK WITHOUT MICROSCOPE  -     X-Ray Abdomen AP 1 View; Future; Expected date: 09/07/2017    Ureteral stone    Hematuria, microscopic        I reviewed the KUB.  There is a large calcification over the left renal shadow which is a gallstone seen on CT scan.  Another calcification is seen along the distal stent which I think corresponds to a phlebolith seen on CT scan although difficult to know for sure.  I recommend removing stent and observation.

## 2017-10-03 NOTE — DISCHARGE SUMMARY
[facility]  Discharge Note  Short Stay    Admit Date: 10/3/2017    Discharge Date and Time: No discharge date for patient encounter.     Attending Physician: JUAN JOSÉ London MD     Discharge Provider: BRIGITTE London    Diagnoses:  Active Hospital Problems    Diagnosis  POA    Right ureteral stone [N20.1]  Yes    Kidney stone [N20.0]  Yes      Resolved Hospital Problems    Diagnosis Date Resolved POA   No resolved problems to display.       Discharged Condition: stable    Hospital Course: Patient was admitted for an outpatient procedure and tolerated the procedure well with no complications.    Final Diagnoses: Same as principal problem.    Disposition: Home or Self Care    Follow up/Patient Instructions:    Medications:  Reconciled Home Medications:   Current Discharge Medication List      START taking these medications    Details   ciprofloxacin HCl (CIPRO) 500 MG tablet Take 1 tablet (500 mg total) by mouth 2 (two) times daily.  Qty: 10 tablet, Refills: 0         CONTINUE these medications which have NOT CHANGED    Details   albuterol 90 mcg/actuation inhaler Inhale 2 puffs into the lungs every 6 (six) hours as needed.      glipiZIDE (GLUCOTROL) 10 MG tablet Take 10 mg by mouth once daily.      hydrocodone-acetaminophen 5-325mg (NORCO) 5-325 mg per tablet Take 1 tablet by mouth every 6 (six) hours as needed for Pain.  Qty: 20 tablet, Refills: 0      metformin (GLUCOPHAGE) 1000 MG tablet Take 1,000 mg by mouth 2 (two) times daily.      multivitamin (ONE DAILY MULTIVITAMIN) per tablet Take 1 tablet by mouth once daily.      !! ACCU-CHEK SOFTCLIX LANCETS Misc TEST TID UTD  Refills: 6      blood sugar diagnostic (ACCU-CHEK RUSTY PLUS TEST STRP) Strp 1 strip 3 (three) times daily.      !! lancets (ACCU-CHEK SOFTCLIX LANCETS) Misc 1 each by Other route 2 (two) times daily.       !! - Potential duplicate medications found. Please discuss with provider.          Discharge Procedure Orders  Diet general      Activity as tolerated   Scheduling Instructions: Keep hydrated.  Follow up next week for stent removal     Call MD for:  temperature >100.4     Call MD for:  persistent nausea and vomiting or diarrhea     Call MD for:  severe uncontrolled pain           Discharge Procedure Orders (must include Diet, Follow-up, Activity):    Discharge Procedure Orders (must include Diet, Follow-up, Activity)  Diet general     Activity as tolerated   Scheduling Instructions: Keep hydrated.  Follow up next week for stent removal     Call MD for:  temperature >100.4     Call MD for:  persistent nausea and vomiting or diarrhea     Call MD for:  severe uncontrolled pain

## 2017-10-03 NOTE — OP NOTE
[facility]  Surgery Department  Operative Note    SUMMARY     Date of Procedure: 10/3/2017     Procedure: Procedure(s) (LRB):  CYSTOSCOPY WITH RETROGRADE PYELOGRAM (Right)  REMOVAL-STENT-URETERAL (Right)  LITHOTRIPSY-LASER (Right)  REMOVAL-STONE-URETERAL (Right)  CYSTOSCOPY WITH STENT PLACEMENT (Right)     Surgeon(s) and Role:     * JUAN JOSÉ London MD - Primary    Assisting Surgeon: None    Pre-Operative Diagnosis: Kidney stone [N20.0]  Ureteral stone [N20.1]    Post-Operative Diagnosis: Post-Op Diagnosis Codes:     * Kidney stone [N20.0]     * Ureteral stone [N20.1]    Anesthesia: General    Technical Procedures Used: endoscopy    Description of the Findings of the Procedure: 6 mm stone in distal ureter    Significant Surgical Tasks Conducted by the Assistant(s), if Applicable: n/a    Complications: No    Estimated Blood Loss (EBL): * No values recorded between 10/3/2017  7:38 AM and 10/3/2017  8:20 AM *           Implants: * No implants in log *    Specimens:   Specimen (12h ago through future)    None                  Condition: Stable    Disposition: PACU - hemodynamically stable.    Attestation: I performed the procedure.

## 2017-10-03 NOTE — TELEPHONE ENCOUNTER
----- Message from Naina Dick sent at 10/3/2017  5:04 PM CDT -----  Contact: Self  Good afternoon,    Pt missed a call and would like the nurse to return their call.    Pt can be reached at [phone].    Thank you!

## 2017-10-03 NOTE — INTERVAL H&P NOTE
The patient has been examined and the H&P has been reviewed:    I concur with the findings and changes have been noted since the H&P was written: Plan right ureteroscopy and laser lithotripsy    Anesthesia/Surgery risks, benefits and alternative options discussed and understood by patient/family.          Active Hospital Problems    Diagnosis  POA    Right ureteral stone [N20.1]  Yes    Kidney stone [N20.0]  Yes      Resolved Hospital Problems    Diagnosis Date Resolved POA   No resolved problems to display.

## 2017-10-03 NOTE — TELEPHONE ENCOUNTER
----- Message from Sabina Christensen sent at 10/3/2017  4:05 PM CDT -----  Contact: pt, # [phone]  Requesting PO Appt for Monday/10-9, preferably morning  Call back on # [phone]  thanks

## 2017-10-03 NOTE — TRANSFER OF CARE
"Anesthesia Transfer of Care Note    Patient: Aida Virk    Procedure(s) Performed: Procedure(s) (LRB):  CYSTOSCOPY WITH RETROGRADE PYELOGRAM (Right)  REMOVAL-STENT-URETERAL (Right)  LITHOTRIPSY-LASER (Right)  REMOVAL-STONE-URETERAL (Right)  CYSTOSCOPY WITH STENT PLACEMENT (Right)    Patient location: PACU    Anesthesia Type: general    Transport from OR: Transported from OR on room air with adequate spontaneous ventilation    Post pain: adequate analgesia    Post assessment: no apparent anesthetic complications    Post vital signs: stable    Level of consciousness: awake    Nausea/Vomiting: no nausea/vomiting    Complications: none    Transfer of care protocol was followed      Last vitals:   Visit Vitals  /72 (BP Location: Right arm, Patient Position: Lying)   Pulse 93   Temp 36.5 °C (97.7 °F) (Skin)   Resp 18   Ht 5' 6" (1.676 m)   Wt 70.3 kg (155 lb)   SpO2 97%   Breastfeeding? No   BMI 25.02 kg/m²     "

## 2017-10-04 NOTE — TELEPHONE ENCOUNTER
----- Message from Marlena Echols sent at 10/4/2017  9:20 AM CDT -----  Contact: cherelle Saunders call   Call back

## 2017-10-05 ENCOUNTER — TELEPHONE (OUTPATIENT)
Dept: UROLOGY | Facility: CLINIC | Age: 67
End: 2017-10-05

## 2017-10-05 NOTE — TELEPHONE ENCOUNTER
Spoke to pt and informed her how to remove a stent. Dr London said she can remove the stent. She had pulled part of the stent out and was leaking urine all night. She pulled the stent without difficulty while I was on the phone with her. We cancelled her apt for Friday.

## 2017-10-05 NOTE — TELEPHONE ENCOUNTER
----- Message from Kandis Maradiaga sent at 10/5/2017  8:05 AM CDT -----  Contact: self  Patient states stent came half way out last night   Patient states urine running all night still running  Need appointment this morning   Please call pt at [phone]

## 2017-10-06 LAB
ANNOTATION COMMENT IMP: NORMAL
COMPN STONE: NORMAL
SPECIMEN SOURCE: NORMAL
STONE ANALYSIS IR-IMP: NORMAL

## 2017-12-27 ENCOUNTER — TELEPHONE (OUTPATIENT)
Dept: UROLOGY | Facility: CLINIC | Age: 67
End: 2017-12-27

## 2017-12-27 NOTE — TELEPHONE ENCOUNTER
----- Message from Jesus Mena sent at 12/27/2017  1:56 PM CST -----  Contact: self   Patient needs a appointment today, she is having severe back pain. Please call back at [phone] (home)

## 2017-12-27 NOTE — TELEPHONE ENCOUNTER
Spoke to pt and booked her an apt for Friday at 2:45 for flank pain. An apt was offered for today but pt needs someone to drive her and there is not enough time.

## 2020-07-16 ENCOUNTER — LAB VISIT (OUTPATIENT)
Dept: PRIMARY CARE CLINIC | Facility: OTHER | Age: 70
End: 2020-07-16
Attending: INTERNAL MEDICINE
Payer: MEDICARE

## 2020-07-16 DIAGNOSIS — Z11.59 SPECIAL SCREENING EXAMINATION FOR UNSPECIFIED VIRAL DISEASE: ICD-10-CM

## 2020-07-16 PROCEDURE — U0003 INFECTIOUS AGENT DETECTION BY NUCLEIC ACID (DNA OR RNA); SEVERE ACUTE RESPIRATORY SYNDROME CORONAVIRUS 2 (SARS-COV-2) (CORONAVIRUS DISEASE [COVID-19]), AMPLIFIED PROBE TECHNIQUE, MAKING USE OF HIGH THROUGHPUT TECHNOLOGIES AS DESCRIBED BY CMS-2020-01-R: HCPCS

## 2020-07-20 LAB — SARS-COV-2 RNA RESP QL NAA+PROBE: NEGATIVE

## 2023-08-03 ENCOUNTER — OFFICE VISIT (OUTPATIENT)
Dept: PULMONOLOGY | Facility: CLINIC | Age: 73
End: 2023-08-03
Payer: MEDICARE

## 2023-08-03 ENCOUNTER — TELEPHONE (OUTPATIENT)
Dept: PULMONOLOGY | Facility: CLINIC | Age: 73
End: 2023-08-03

## 2023-08-03 ENCOUNTER — HOSPITAL ENCOUNTER (OUTPATIENT)
Dept: RADIOLOGY | Facility: HOSPITAL | Age: 73
Discharge: HOME | End: 2023-08-03
Attending: NURSE PRACTITIONER
Payer: MEDICARE

## 2023-08-03 VITALS
DIASTOLIC BLOOD PRESSURE: 86 MMHG | WEIGHT: 147.19 LBS | SYSTOLIC BLOOD PRESSURE: 135 MMHG | HEART RATE: 91 BPM | HEIGHT: 67 IN | OXYGEN SATURATION: 95 % | BODY MASS INDEX: 23.1 KG/M2

## 2023-08-03 DIAGNOSIS — J45.41 MODERATE PERSISTENT ASTHMA WITH ACUTE EXACERBATION: Primary | ICD-10-CM

## 2023-08-03 DIAGNOSIS — J45.41 MODERATE PERSISTENT ASTHMA WITH ACUTE EXACERBATION: ICD-10-CM

## 2023-08-03 PROBLEM — J45.21 MILD INTERMITTENT ASTHMA WITH ACUTE EXACERBATION: Status: ACTIVE | Noted: 2023-08-03

## 2023-08-03 PROCEDURE — 99999PBSHW PR PBB SHADOW TECHNICAL ONLY FILED TO HB: Mod: PBBFAC,,, | Performed by: NURSE PRACTITIONER

## 2023-08-03 PROCEDURE — 99204 OFFICE O/P NEW MOD 45 MIN: CPT | Mod: S$PBB,,, | Performed by: NURSE PRACTITIONER

## 2023-08-03 PROCEDURE — 99204 PR OFFICE/OUTPT VISIT, NEW, LEVL IV, 45-59 MIN: ICD-10-PCS | Mod: S$PBB,,, | Performed by: NURSE PRACTITIONER

## 2023-08-03 PROCEDURE — 99999 PR PBB SHADOW E&M-NEW PATIENT-LVL III: ICD-10-PCS | Mod: PBBFAC,,, | Performed by: NURSE PRACTITIONER

## 2023-08-03 PROCEDURE — 99999 PR PBB SHADOW E&M-NEW PATIENT-LVL III: CPT | Mod: PBBFAC,,, | Performed by: NURSE PRACTITIONER

## 2023-08-03 PROCEDURE — 99203 OFFICE O/P NEW LOW 30 MIN: CPT | Mod: PBBFAC,PO | Performed by: NURSE PRACTITIONER

## 2023-08-03 PROCEDURE — 96372 THER/PROPH/DIAG INJ SC/IM: CPT | Mod: PBBFAC,PO

## 2023-08-03 PROCEDURE — 71046 X-RAY EXAM CHEST 2 VIEWS: CPT | Mod: TC,PO

## 2023-08-03 PROCEDURE — 99999PBSHW PR PBB SHADOW TECHNICAL ONLY FILED TO HB: ICD-10-PCS | Mod: PBBFAC,,, | Performed by: NURSE PRACTITIONER

## 2023-08-03 RX ORDER — AZITHROMYCIN 500 MG/1
TABLET, FILM COATED ORAL
Qty: 3 TABLET | Refills: 0 | Status: SHIPPED | OUTPATIENT
Start: 2023-08-03 | End: 2023-09-15

## 2023-08-03 RX ORDER — BETAMETHASONE SODIUM PHOSPHATE AND BETAMETHASONE ACETATE 3; 3 MG/ML; MG/ML
6 INJECTION, SUSPENSION INTRA-ARTICULAR; INTRALESIONAL; INTRAMUSCULAR; SOFT TISSUE
Status: COMPLETED | OUTPATIENT
Start: 2023-08-03 | End: 2023-08-03

## 2023-08-03 RX ORDER — PREDNISONE 20 MG/1
TABLET ORAL
Qty: 9 TABLET | Refills: 0 | Status: SHIPPED | OUTPATIENT
Start: 2023-08-03 | End: 2023-09-15

## 2023-08-03 RX ORDER — ALBUTEROL SULFATE 90 UG/1
2 AEROSOL, METERED RESPIRATORY (INHALATION) EVERY 6 HOURS PRN
Qty: 18 G | Refills: 11 | Status: SHIPPED | OUTPATIENT
Start: 2023-08-03 | End: 2023-09-15

## 2023-08-03 RX ADMIN — BETAMETHASONE SODIUM PHOSPHATE AND BETAMETHASONE ACETATE 6 MG: 3; 3 INJECTION, SUSPENSION INTRA-ARTICULAR; INTRALESIONAL; INTRAMUSCULAR at 11:08

## 2023-08-03 NOTE — PROGRESS NOTES
8/3/2023    Aida Virk  New Patient Consult    Chief Complaint   Patient presents with    Cough    Shortness of Breath       HPI:  08/03/2023:  Denies being seen by prior Pulmonologist. Denies current use of maintenance inhaler. Denies current use of supplemental oxygen, CPAP. Denies personal history of cancer. Does endorse history of recurrent DVT (2 total) - not on anticoagulation - states she uses homeopathic and lifestyle remedies.   Diagnosed with asthma in 1970's - has been well controlled overall. Currently using Albuterol PRN - states over the last two weeks has been requiring increased use, approx 4x per day with short lived benefit. Used to use Advair or Wixella in the past however has been out for approx 4 months.  Cough is persistent over the last two weeks - productive with clear mucous - associated with wheezing, chest tightness. Cough is worse at night time. Cough is associated with nocturnal arousals.   Shortness of breath is worsening over the last two weeks - not exertional, random occurrence.             Social Hx: Lives with roommates - no animals in the home. 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 Asbestosis exposure, Smoking Hx: Never smoker   Family Hx: No Lung Cancer, No COPD, No Asthma  Medical Hx: No previous pneumonia ; No previous shoulder/chest surgery        The Chief Complaint is: New to me      PFSH:  Past Medical History:   Diagnosis Date    Asthma     Diabetes mellitus     General anesthetics causing adverse effect in therapeutic use     shallow breather    Nasal polyps     bilateral         Past Surgical History:   Procedure Laterality Date    HYSTERECTOMY      KNEE ARTHROSCOPY      right    NASAL POLYP SURGERY      three times    PELVIC LAPAROSCOPY      ROTATOR CUFF REPAIR Right     TONSILLECTOMY       Social History     Tobacco Use    Smoking status: Never    Smokeless tobacco: Never   Substance Use Topics    Alcohol use: No    Drug use: Yes     Types:  "Hydrocodone     No family history on file.  Review of patient's allergies indicates:   Allergen Reactions    Aspirin Anaphylaxis    Nsaids (non-steroidal anti-inflammatory drug) Anaphylaxis    Penicillins Hives and Itching    Codeine Nausea Only         I have reviewed past medical, family, and social history.     Performance Status:The patient's activity level is functions out of house.        Review of Systems   Constitutional:  Positive for fatigue. Negative for activity change, appetite change, chills, diaphoresis, fever and unexpected weight change.   HENT:  Negative for congestion, postnasal drip, rhinorrhea, sinus pressure, sinus pain, sore throat and trouble swallowing.    Eyes:  Negative for photophobia and visual disturbance.   Respiratory:  Positive for cough, shortness of breath and wheezing. Negative for choking and chest tightness.    Cardiovascular:  Positive for leg swelling. Negative for chest pain and palpitations.        R leg swelling     Gastrointestinal:  Negative for abdominal distention, abdominal pain, blood in stool, constipation, diarrhea, nausea and vomiting.   Genitourinary:  Negative for difficulty urinating, dysuria, flank pain and hematuria.   Musculoskeletal:  Negative for back pain, gait problem, joint swelling and neck pain.   Skin:  Negative for rash and wound.   Allergic/Immunologic: Positive for environmental allergies. Negative for food allergies.   Neurological:  Negative for dizziness, seizures, syncope, weakness, light-headedness, numbness and headaches.   Hematological:  Does not bruise/bleed easily.   Psychiatric/Behavioral:  Positive for sleep disturbance. Negative for confusion. The patient is not nervous/anxious.          Exam:Comprehensive exam done. /86 (BP Location: Left arm, Patient Position: Sitting, BP Method: Large (Automatic))   Pulse 91   Ht 5' 7" (1.702 m)   Wt 66.7 kg (147 lb 2.5 oz)   SpO2 95% Comment: room air at rest  BMI 23.05 kg/m²   Exam " "included Vitals as listed  Constitutional: She is oriented to person, place, and time. She appears well-developed. No distress.   Nose: Nose normal.   Mouth/Throat: Uvula is midline, oropharynx is clear and moist and mucous membranes are normal. No dental caries. No oropharyngeal exudate, posterior oropharyngeal edema, posterior oropharyngeal erythema or tonsillar abscesses.    Eyes: Pupils are equal, round, and reactive to light.   Neck: No JVD present. No thyromegaly present.   Cardiovascular: Normal rate, regular rhythm and normal heart sounds. Exam reveals no gallop and no friction rub.   No murmur heard.  Pulmonary/Chest: Effort normal and breath sounds normal. No accessory muscle usage or stridor. No apnea and no tachypnea. No respiratory distress, decreased breath sounds, wheezes, rhonchi, rales, or tenderness. Diffuse wheezing throughout all lung fields, on room air, in no acute distress.   Abdominal: Soft. She exhibits no mass. There is no tenderness. No hepatosplenomegaly, hernias and normoactive bowel sounds  Musculoskeletal: Normal range of motion. exhibits no edema.   Neurological:  alert and oriented to person, place, and time. not disoriented.   Skin: Skin is warm and dry. Capillary refill takes less 2 sec. No cyanosis or erythema. No pallor. Nails show no clubbing.   Psychiatric: normal mood and affect. behavior is normal. Judgment and thought content normal.       Radiographs (ct chest and cxr) reviewed: was done   Patient imaging studies were reviewed and interpreted independently. My personal interpretation of most recent images include:  CXR - 2013 - Lungs with no acute process.        Labs Patient's labs were reviewed including CBC and CMP  was not done     No results found for: "WBC", "RBC", "HGB", "HCT", "MCV", "MCH", "MCHC", "RDW", "PLT", "MPV", "GRAN", "LYMPH", "MONO", "EOS", "BASO", "EOSINOPHIL", "BASOPHIL"      PFT was not done  Pulmonary Functions Testing Results:        Plan:  Clinical " impression is resonably certain and repeated evaluation prn +/- follow up will be needed as below.    Aida was seen today for cough and shortness of breath.    Diagnoses and all orders for this visit:    Moderate persistent asthma with acute exacerbation  -     X-Ray Chest PA And Lateral; Future  -     predniSONE (DELTASONE) 20 MG tablet; Take two tablets per day for three days. One tablet per day for three days.  -     azithromycin (ZITHROMAX) 500 MG tablet; Take one tablet per day for three days as needed for mucous production  -     albuterol (PROVENTIL/VENTOLIN HFA) 90 mcg/actuation inhaler; Inhale 2 puffs into the lungs every 6 (six) hours as needed for Wheezing or Shortness of Breath. Rescue  -     betamethasone acetate-betamethasone sodium phosphate injection 6 mg        Follow up in about 6 weeks (around 9/14/2023), or if symptoms worsen or fail to improve.    Discussed with patient above for education the following:      Patient Instructions   Recommend maintenance inhaler at this time. Can start Trelegy - this is ONE PUFF ONCE PER DAY. This inhaler contains an inhaled steroid component. Rinse mouth after each use due to risk for thrush development. If mouth or tongue develops white sores please contact the clinic and I will order a prescription mouth wash.     Continue to use Albuterol as needed for shortness of breath, wheezing, chest tightness.    Prednisone - take as prescribed. Start this tomorrow.    Celestone injection given in office today.    Azithromycin - take only as needed for green/yellow mucous production.    Chest xray now to rule out pneumonia.    Continue current prescription medication regiment. Keep follow up appointment as scheduled. Please call the office if you have any questions or concerns.

## 2023-08-03 NOTE — TELEPHONE ENCOUNTER
----- Message from Shoshana Guerra NP sent at 8/3/2023 12:59 PM CDT -----  Chest xray is normal - no evidence of pneumonia noted.  Continue current prescription medication regiment. Keep follow up appointment as scheduled. Please call the office if you have any questions or concerns.

## 2023-08-03 NOTE — PATIENT INSTRUCTIONS
Recommend maintenance inhaler at this time. Can start Trelegy - this is ONE PUFF ONCE PER DAY. This inhaler contains an inhaled steroid component. Rinse mouth after each use due to risk for thrush development. If mouth or tongue develops white sores please contact the clinic and I will order a prescription mouth wash.     Continue to use Albuterol as needed for shortness of breath, wheezing, chest tightness.    Prednisone - take as prescribed. Start this tomorrow.    Celestone injection given in office today.    Azithromycin - take only as needed for green/yellow mucous production.    Chest xray now to rule out pneumonia.    Continue current prescription medication regiment. Keep follow up appointment as scheduled. Please call the office if you have any questions or concerns.

## 2023-08-09 ENCOUNTER — PATIENT MESSAGE (OUTPATIENT)
Dept: PULMONOLOGY | Facility: CLINIC | Age: 73
End: 2023-08-09
Payer: MEDICARE

## 2023-09-15 ENCOUNTER — OFFICE VISIT (OUTPATIENT)
Dept: PULMONOLOGY | Facility: CLINIC | Age: 73
End: 2023-09-15
Payer: MEDICARE

## 2023-09-15 ENCOUNTER — PATIENT MESSAGE (OUTPATIENT)
Dept: PULMONOLOGY | Facility: CLINIC | Age: 73
End: 2023-09-15

## 2023-09-15 VITALS
HEIGHT: 67 IN | WEIGHT: 147.06 LBS | SYSTOLIC BLOOD PRESSURE: 133 MMHG | HEART RATE: 84 BPM | OXYGEN SATURATION: 96 % | BODY MASS INDEX: 23.08 KG/M2 | DIASTOLIC BLOOD PRESSURE: 82 MMHG

## 2023-09-15 DIAGNOSIS — J45.20 MILD INTERMITTENT ASTHMA WITHOUT COMPLICATION: Primary | ICD-10-CM

## 2023-09-15 PROCEDURE — 99213 OFFICE O/P EST LOW 20 MIN: CPT | Mod: S$PBB,,, | Performed by: NURSE PRACTITIONER

## 2023-09-15 PROCEDURE — 99213 OFFICE O/P EST LOW 20 MIN: CPT | Mod: PBBFAC,PO | Performed by: NURSE PRACTITIONER

## 2023-09-15 PROCEDURE — 99213 PR OFFICE/OUTPT VISIT, EST, LEVL III, 20-29 MIN: ICD-10-PCS | Mod: S$PBB,,, | Performed by: NURSE PRACTITIONER

## 2023-09-15 PROCEDURE — 99999 PR PBB SHADOW E&M-EST. PATIENT-LVL III: ICD-10-PCS | Mod: PBBFAC,,, | Performed by: NURSE PRACTITIONER

## 2023-09-15 PROCEDURE — 99999 PR PBB SHADOW E&M-EST. PATIENT-LVL III: CPT | Mod: PBBFAC,,, | Performed by: NURSE PRACTITIONER

## 2023-09-15 RX ORDER — FLUTICASONE FUROATE, UMECLIDINIUM BROMIDE AND VILANTEROL TRIFENATATE 200; 62.5; 25 UG/1; UG/1; UG/1
POWDER RESPIRATORY (INHALATION)
COMMUNITY
Start: 2023-09-14 | End: 2023-10-05 | Stop reason: SDUPTHER

## 2023-09-15 RX ORDER — PREDNISONE 20 MG/1
TABLET ORAL
Qty: 15 TABLET | Refills: 0 | Status: SHIPPED | OUTPATIENT
Start: 2023-09-15 | End: 2023-11-30 | Stop reason: SDUPTHER

## 2023-09-15 NOTE — PROGRESS NOTES
9/15/2023    Aida Virk  In office visit     Chief Complaint   Patient presents with    Asthma       HPI:  09/15/2023:  Feels much better in comparison to prior office visit. States her breathing responded greatly to the steroid injection and PO regimen. Also completed Azithromycin with benefit.   Used Trelegy three times total with benefit. Using PRN Ventolin - hasn't required in weeks.   States she has noticed dairy causes worsened resp symptoms - states has been avoiding lately.   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         08/03/2023:  Denies being seen by prior Pulmonologist. Denies current use of maintenance inhaler. Denies current use of supplemental oxygen, CPAP. Denies personal history of cancer. Does endorse history of recurrent DVT (2 total) - not on anticoagulation - states she uses homeopathic and lifestyle remedies.   Diagnosed with asthma in 1970's - has been well controlled overall. Currently using Albuterol PRN - states over the last two weeks has been requiring increased use, approx 4x per day with short lived benefit. Used to use Advair or Wixella in the past however has been out for approx 4 months.  Cough is persistent over the last two weeks - productive with clear mucous - associated with wheezing, chest tightness. Cough is worse at night time. Cough is associated with nocturnal arousals.   Shortness of breath is worsening over the last two weeks - not exertional, random occurrence.   Patient Instructions   Recommend maintenance inhaler at this time. Can start Trelegy - this is ONE PUFF ONCE PER DAY. This inhaler contains an inhaled steroid component. Rinse mouth after each use due to risk for thrush development. If mouth or tongue develops white sores please contact the clinic and I will order a prescription mouth wash.   Continue to use Albuterol as needed for shortness of breath, wheezing, chest tightness.  Prednisone - take as prescribed. Start this  tomorrow.  Celestone injection given in office today.  Azithromycin - take only as needed for green/yellow mucous production.  Chest xray now to rule out pneumonia.  Continue current prescription medication regiment. Keep follow up appointment as scheduled. Please call the office if you have any questions or concerns.             Social Hx: Lives with roommates - no animals in the home. 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     PFSH:  Past Medical History:   Diagnosis Date    Asthma     Diabetes mellitus     General anesthetics causing adverse effect in therapeutic use     shallow breather    Nasal polyps     bilateral         Past Surgical History:   Procedure Laterality Date    HYSTERECTOMY      KNEE ARTHROSCOPY      right    NASAL POLYP SURGERY      three times    PELVIC LAPAROSCOPY      ROTATOR CUFF REPAIR Right     TONSILLECTOMY       Social History     Tobacco Use    Smoking status: Never    Smokeless tobacco: Never   Substance Use Topics    Alcohol use: No    Drug use: Yes     Types: Hydrocodone     No family history on file.  Review of patient's allergies indicates:   Allergen Reactions    Aspirin Anaphylaxis    Nsaids (non-steroidal anti-inflammatory drug) Anaphylaxis    Penicillins Hives and Itching    Codeine Nausea Only         I have reviewed past medical, family, and social history.     Performance Status:The patient's activity level is functions out of house.        Review of Systems:  a review of eleven systems covering constitutional, Eye, HEENT, Psych, Respiratory, Cardiac, GI, , Musculoskeletal, Endocrine, Dermatologic was negative except for pertinent findings as listed ABOVE and below: pertinent positive as above, rest is good      "  Exam:Comprehensive exam done. /82 (BP Location: Left arm, Patient Position: Sitting, BP Method: Medium (Automatic))   Pulse 84   Ht 5' 7" (1.702 m)   Wt 66.7 kg (147 lb 0.8 oz)   SpO2 96%   BMI 23.03 kg/m²   Exam included Vitals as listed  Constitutional: She is oriented to person, place, and time. She appears well-developed. No distress.   Nose: Nose normal.   Mouth/Throat: Uvula is midline, oropharynx is clear and moist and mucous membranes are normal. No dental caries. No oropharyngeal exudate, posterior oropharyngeal edema, posterior oropharyngeal erythema or tonsillar abscesses.    Eyes: Pupils are equal, round, and reactive to light.   Neck: No JVD present. No thyromegaly present.   Cardiovascular: Normal rate, regular rhythm and normal heart sounds. Exam reveals no gallop and no friction rub.   No murmur heard.  Pulmonary/Chest: Effort normal and breath sounds normal. No accessory muscle usage or stridor. No apnea and no tachypnea. No respiratory distress, decreased breath sounds, wheezes, rhonchi, rales, or tenderness. Faint expiratory wheezing to RLL field, Clear throughout rest. On room air, in no acute distress.   Abdominal: Soft. She exhibits no mass. There is no tenderness. No hepatosplenomegaly, hernias and normoactive bowel sounds  Musculoskeletal: Normal range of motion. exhibits no edema.   Neurological:  alert and oriented to person, place, and time. not disoriented.   Skin: Skin is warm and dry. Capillary refill takes less 2 sec. No cyanosis or erythema. No pallor. Nails show no clubbing.   Psychiatric: normal mood and affect. behavior is normal. Judgment and thought content normal.       Radiographs (ct chest and cxr) reviewed: was done   Patient imaging studies were reviewed and interpreted independently. My personal interpretation of most recent images include:  CXR 8/3/2023 Clear  CXR - 2013 - Lungs with no acute process.        Labs Patient's labs were reviewed including CBC and " "CMP  was not done     No results found for: "WBC", "RBC", "HGB", "HCT", "MCV", "MCH", "MCHC", "RDW", "PLT", "MPV", "GRAN", "LYMPH", "MONO", "EOS", "BASO", "EOSINOPHIL", "BASOPHIL"      PFT was not done  Pulmonary Functions Testing Results:        Plan:  Clinical impression is resonably certain and repeated evaluation prn +/- follow up will be needed as below.    Aida was seen today for asthma.    Diagnoses and all orders for this visit:    Mild intermittent asthma without complication  -     X-Ray Chest PA And Lateral; Standing  -     predniSONE (DELTASONE) 20 MG tablet; Take one pill per day for three days as needed for shortness of breath, wheezing, cough. Repeat as needed.          Follow up in about 3 months (around 12/15/2023).    Discussed with patient above for education the following:      Patient Instructions   Expect asthma symptoms to flare.    Can use Trelegy once per day and Albuterol as needed.    Take Prednisone as prescribed ONLY as needed - use sparingly.    Chest xray standing order placed only to be done as needed.    Continue current prescription medication regiment. Keep follow up appointment as scheduled. Please call the office if you have any questions or concerns.     "

## 2023-09-15 NOTE — PATIENT INSTRUCTIONS
Expect asthma symptoms to flare.    Can use Trelegy once per day and Albuterol as needed.    Take Prednisone as prescribed ONLY as needed - use sparingly.    Chest xray standing order placed only to be done as needed.    Continue current prescription medication regiment. Keep follow up appointment as scheduled. Please call the office if you have any questions or concerns.

## 2023-10-05 ENCOUNTER — PATIENT MESSAGE (OUTPATIENT)
Dept: PULMONOLOGY | Facility: CLINIC | Age: 73
End: 2023-10-05
Payer: MEDICARE

## 2023-10-06 RX ORDER — FLUTICASONE FUROATE, UMECLIDINIUM BROMIDE AND VILANTEROL TRIFENATATE 200; 62.5; 25 UG/1; UG/1; UG/1
1 POWDER RESPIRATORY (INHALATION) DAILY
Qty: 60 EACH | Refills: 11 | Status: SHIPPED | OUTPATIENT
Start: 2023-10-06 | End: 2023-11-30 | Stop reason: SDUPTHER

## 2023-11-29 ENCOUNTER — TELEPHONE (OUTPATIENT)
Dept: PULMONOLOGY | Facility: CLINIC | Age: 73
End: 2023-11-29
Payer: MEDICARE

## 2023-11-29 NOTE — TELEPHONE ENCOUNTER
----- Message from Araseli Weaver, Patient Care Assistant sent at 11/29/2023  7:35 AM CST -----  Regarding: appointment  Contact: pt  Type:  Sooner Appointment Request    Caller is requesting a sooner appointment.  Caller declined first available appointment listed below.  Caller will not accept being placed on the waitlist and is requesting a message be sent to doctor.    Name of Caller: pt     When is the first available appointment?  2024    Symptoms:  SOB and legs swollen     Would the patient rather a call back or a response via [facility]? Callback     Best Call Back Number:  [phone] (home)     Additional Information:  please call pt to advise. Thanks!

## 2023-11-30 ENCOUNTER — TELEPHONE (OUTPATIENT)
Dept: PULMONOLOGY | Facility: CLINIC | Age: 73
End: 2023-11-30

## 2023-11-30 ENCOUNTER — OFFICE VISIT (OUTPATIENT)
Dept: PULMONOLOGY | Facility: CLINIC | Age: 73
End: 2023-11-30
Payer: MEDICARE

## 2023-11-30 ENCOUNTER — CLINICAL SUPPORT (OUTPATIENT)
Dept: CARDIOLOGY | Facility: HOSPITAL | Age: 73
End: 2023-11-30
Attending: NURSE PRACTITIONER
Payer: MEDICARE

## 2023-11-30 ENCOUNTER — HOSPITAL ENCOUNTER (OUTPATIENT)
Dept: RADIOLOGY | Facility: HOSPITAL | Age: 73
Discharge: HOME | End: 2023-11-30
Attending: NURSE PRACTITIONER
Payer: MEDICARE

## 2023-11-30 VITALS — BODY MASS INDEX: 23.39 KG/M2 | WEIGHT: 149 LBS | HEIGHT: 67 IN

## 2023-11-30 VITALS
SYSTOLIC BLOOD PRESSURE: 119 MMHG | OXYGEN SATURATION: 95 % | DIASTOLIC BLOOD PRESSURE: 75 MMHG | HEIGHT: 67 IN | WEIGHT: 149.13 LBS | BODY MASS INDEX: 23.4 KG/M2 | HEART RATE: 97 BPM

## 2023-11-30 DIAGNOSIS — R60.0 EDEMA OF BOTH LOWER EXTREMITIES: ICD-10-CM

## 2023-11-30 DIAGNOSIS — Z13.6 ENCOUNTER FOR SCREENING FOR CARDIOVASCULAR DISORDERS: ICD-10-CM

## 2023-11-30 DIAGNOSIS — J45.21 MILD INTERMITTENT ASTHMA WITH ACUTE EXACERBATION: Primary | ICD-10-CM

## 2023-11-30 DIAGNOSIS — R05.9 COUGH, UNSPECIFIED TYPE: ICD-10-CM

## 2023-11-30 DIAGNOSIS — R06.02 SHORTNESS OF BREATH: ICD-10-CM

## 2023-11-30 DIAGNOSIS — R06.01 ORTHOPNEA: ICD-10-CM

## 2023-11-30 DIAGNOSIS — J45.21 MILD INTERMITTENT ASTHMA WITH ACUTE EXACERBATION: ICD-10-CM

## 2023-11-30 DIAGNOSIS — R79.89 OTHER SPECIFIED ABNORMAL FINDINGS OF BLOOD CHEMISTRY: ICD-10-CM

## 2023-11-30 LAB
ASCENDING AORTA: 3.34 CM
AV INDEX (PROSTH): 0.86
AV MEAN GRADIENT: 4 MMHG
AV PEAK GRADIENT: 7 MMHG
AV VALVE AREA BY VELOCITY RATIO: 2.91 CM²
AV VALVE AREA: 3.3 CM²
AV VELOCITY RATIO: 0.76
BSA FOR ECHO PROCEDURE: 1.79 M2
CV ECHO LV RWT: 0.35 CM
DOP CALC AO PEAK VEL: 1.33 M/S
DOP CALC AO VTI: 26.6 CM
DOP CALC LVOT AREA: 3.8 CM2
DOP CALC LVOT DIAMETER: 2.21 CM
DOP CALC LVOT PEAK VEL: 1.01 M/S
DOP CALC LVOT STROKE VOLUME: 87.8 CM3
DOP CALCLVOT PEAK VEL VTI: 22.9 CM
E WAVE DECELERATION TIME: 249.48 MSEC
E/A RATIO: 0.87
E/E' RATIO: 11.85 M/S
ECHO LV POSTERIOR WALL: 0.86 CM (ref 0.6–1.1)
EJECTION FRACTION: 65 %
FRACTIONAL SHORTENING: 33 % (ref 28–44)
INTERVENTRICULAR SEPTUM: 0.84 CM (ref 0.6–1.1)
LEFT ATRIUM SIZE: 3.47 CM
LEFT ATRIUM VOLUME INDEX MOD: 27 ML/M2
LEFT ATRIUM VOLUME MOD: 48.02 CM3
LEFT INTERNAL DIMENSION IN SYSTOLE: 3.3 CM (ref 2.1–4)
LEFT VENTRICLE DIASTOLIC VOLUME INDEX: 65.18 ML/M2
LEFT VENTRICLE DIASTOLIC VOLUME: 116.02 ML
LEFT VENTRICLE MASS INDEX: 81 G/M2
LEFT VENTRICLE SYSTOLIC VOLUME INDEX: 24.8 ML/M2
LEFT VENTRICLE SYSTOLIC VOLUME: 44.17 ML
LEFT VENTRICULAR INTERNAL DIMENSION IN DIASTOLE: 4.96 CM (ref 3.5–6)
LEFT VENTRICULAR MASS: 144.86 G
LV LATERAL E/E' RATIO: 9.63 M/S
LV SEPTAL E/E' RATIO: 15.4 M/S
LVOT MG: 2.09 MMHG
LVOT MV: 0.69 CM/S
MV PEAK A VEL: 0.89 M/S
MV PEAK E VEL: 0.77 M/S
PISA TR MAX VEL: 2.57 M/S
PULM VEIN S/D RATIO: 1.38
PV PEAK D VEL: 0.45 M/S
PV PEAK S VEL: 0.62 M/S
RA PRESSURE ESTIMATED: 3 MMHG
RA VOL SYS: 26.55 ML
RIGHT ATRIAL AREA: 12 CM2
RIGHT VENTRICULAR END-DIASTOLIC DIMENSION: 3.83 CM
RIGHT VENTRICULAR LENGTH IN DIASTOLE (APICAL 4-CHAMBER VIEW): 5.99 CM
RV MID DIAMA: 2.58 CM
RV TB RVSP: 6 MMHG
RV TISSUE DOPPLER FREE WALL SYSTOLIC VELOCITY 1 (APICAL 4 CHAMBER VIEW): 14.88 CM/S
SINUS: 3.58 CM
STJ: 3.2 CM
TDI LATERAL: 0.08 M/S
TDI SEPTAL: 0.05 M/S
TDI: 0.07 M/S
TR MAX PG: 26 MMHG
TRICUSPID ANNULAR PLANE SYSTOLIC EXCURSION: 2.02 CM
TV REST PULMONARY ARTERY PRESSURE: 29 MMHG
Z-SCORE OF LEFT VENTRICULAR DIMENSION IN END DIASTOLE: 0.08
Z-SCORE OF LEFT VENTRICULAR DIMENSION IN END SYSTOLE: 0.64

## 2023-11-30 PROCEDURE — 99999 PR PBB SHADOW E&M-EST. PATIENT-LVL III: CPT | Mod: PBBFAC,,, | Performed by: NURSE PRACTITIONER

## 2023-11-30 PROCEDURE — 3288F FALL RISK ASSESSMENT DOCD: CPT | Mod: CPTII,S$GLB,, | Performed by: NURSE PRACTITIONER

## 2023-11-30 PROCEDURE — 71046 X-RAY EXAM CHEST 2 VIEWS: CPT | Mod: TC

## 2023-11-30 PROCEDURE — 3078F PR MOST RECENT DIASTOLIC BLOOD PRESSURE < 80 MM HG: ICD-10-PCS | Mod: CPTII,S$GLB,, | Performed by: NURSE PRACTITIONER

## 2023-11-30 PROCEDURE — 3008F PR BODY MASS INDEX (BMI) DOCUMENTED: ICD-10-PCS | Mod: CPTII,S$GLB,, | Performed by: NURSE PRACTITIONER

## 2023-11-30 PROCEDURE — 1159F PR MEDICATION LIST DOCUMENTED IN MEDICAL RECORD: ICD-10-PCS | Mod: CPTII,S$GLB,, | Performed by: NURSE PRACTITIONER

## 2023-11-30 PROCEDURE — 99999 PR PBB SHADOW E&M-EST. PATIENT-LVL III: ICD-10-PCS | Mod: PBBFAC,,, | Performed by: NURSE PRACTITIONER

## 2023-11-30 PROCEDURE — 99214 PR OFFICE/OUTPT VISIT, EST, LEVL IV, 30-39 MIN: ICD-10-PCS | Mod: S$GLB,,, | Performed by: NURSE PRACTITIONER

## 2023-11-30 PROCEDURE — 1126F AMNT PAIN NOTED NONE PRSNT: CPT | Mod: CPTII,S$GLB,, | Performed by: NURSE PRACTITIONER

## 2023-11-30 PROCEDURE — 3074F SYST BP LT 130 MM HG: CPT | Mod: CPTII,S$GLB,, | Performed by: NURSE PRACTITIONER

## 2023-11-30 PROCEDURE — 1159F MED LIST DOCD IN RCRD: CPT | Mod: CPTII,S$GLB,, | Performed by: NURSE PRACTITIONER

## 2023-11-30 PROCEDURE — 99214 OFFICE O/P EST MOD 30 MIN: CPT | Mod: S$GLB,,, | Performed by: NURSE PRACTITIONER

## 2023-11-30 PROCEDURE — 3288F PR FALLS RISK ASSESSMENT DOCUMENTED: ICD-10-PCS | Mod: CPTII,S$GLB,, | Performed by: NURSE PRACTITIONER

## 2023-11-30 PROCEDURE — 3078F DIAST BP <80 MM HG: CPT | Mod: CPTII,S$GLB,, | Performed by: NURSE PRACTITIONER

## 2023-11-30 PROCEDURE — 3008F BODY MASS INDEX DOCD: CPT | Mod: CPTII,S$GLB,, | Performed by: NURSE PRACTITIONER

## 2023-11-30 PROCEDURE — 93306 TTE W/DOPPLER COMPLETE: CPT | Mod: 26,,, | Performed by: INTERNAL MEDICINE

## 2023-11-30 PROCEDURE — 93306 ECHO (CUPID ONLY): ICD-10-PCS | Mod: 26,,, | Performed by: INTERNAL MEDICINE

## 2023-11-30 PROCEDURE — 3074F PR MOST RECENT SYSTOLIC BLOOD PRESSURE < 130 MM HG: ICD-10-PCS | Mod: CPTII,S$GLB,, | Performed by: NURSE PRACTITIONER

## 2023-11-30 PROCEDURE — 1101F PR PT FALLS ASSESS DOC 0-1 FALLS W/OUT INJ PAST YR: ICD-10-PCS | Mod: CPTII,S$GLB,, | Performed by: NURSE PRACTITIONER

## 2023-11-30 PROCEDURE — 93306 TTE W/DOPPLER COMPLETE: CPT | Mod: PO

## 2023-11-30 PROCEDURE — 1126F PR PAIN SEVERITY QUANTIFIED, NO PAIN PRESENT: ICD-10-PCS | Mod: CPTII,S$GLB,, | Performed by: NURSE PRACTITIONER

## 2023-11-30 PROCEDURE — 1101F PT FALLS ASSESS-DOCD LE1/YR: CPT | Mod: CPTII,S$GLB,, | Performed by: NURSE PRACTITIONER

## 2023-11-30 RX ORDER — FLUTICASONE FUROATE, UMECLIDINIUM BROMIDE AND VILANTEROL TRIFENATATE 200; 62.5; 25 UG/1; UG/1; UG/1
1 POWDER RESPIRATORY (INHALATION) DAILY
Qty: 60 EACH | Refills: 11 | Status: SHIPPED | OUTPATIENT
Start: 2023-11-30

## 2023-11-30 RX ORDER — PREDNISONE 20 MG/1
TABLET ORAL
Qty: 15 TABLET | Refills: 0 | Status: SHIPPED | OUTPATIENT
Start: 2023-11-30

## 2023-11-30 RX ORDER — ALBUTEROL SULFATE 90 UG/1
2 AEROSOL, METERED RESPIRATORY (INHALATION) EVERY 6 HOURS PRN
Qty: 18 G | Refills: 11 | Status: SHIPPED | OUTPATIENT
Start: 2023-11-30

## 2023-11-30 NOTE — PATIENT INSTRUCTIONS
Overall your symptoms may be concerning for an asthma exacerbation.  I recommend you start Trelegy once per day.  You have [employer] Medicare and Medicaid, this inhaler should not be expensive for you to purchase from the pharmacy.    Continue albuterol as needed for shortness of breath, wheezing, cough.    Prednisone - take as prescribed.  Use sparingly.    There is concern that your symptoms may be secondary to hypervolemia secondary to possible heart failure.  You have significant leg edema in office today.    I recommend checking a chest x-ray today.    I have ordered an echocardiogram to be done to evaluate your heart strength.    Check blood work.  This is to include a CBC, BNP, CMP, hemoglobin A1c, lipid panel, TSH.    Will likely prescribe Lasix - will need results of blood work prior to.    Continue current prescription medication regiment. Keep follow up appointment as scheduled. Please call the office if you have any questions or concerns.

## 2023-11-30 NOTE — PROGRESS NOTES
11/30/2023    Aida Virk  In office visit     Chief Complaint   Patient presents with    3m f/u    Wheezing    Shortness of Breath       HPI:  11/30/2023:  Approx 5 nights ago suffered from a suspected asthma attack - developed cough in the middle of the night. Cough is associated with wheezing, mucous production (not difficult to expectorate). Associated with coughing fits. Associated with nocturnal arousals, difficulty falling asleep. Has been out of Trelegy for approx one month now - states she noticed great benefit with use however pharmacy told her it would cost her $700.   Has been suffering with lower extremity edema as well for many years - had a DVT in the past bilaterally - she underwent vein atrophy - suffers with subsequent leg swelling that is chronic however noticed the swelling has gotten worse over the past week. On 11/21/2023 underwent bilateral lower extremity US which was negative for acute DVT, did show chronic DVT to RLE. States couldn't get her boots on without much difficulty. Is now sleeping elevated with two pillows.         09/15/2023:  Feels much better in comparison to prior office visit. States her breathing responded greatly to the steroid injection and PO regimen. Also completed Azithromycin with benefit.   Used Trelegy three times total with benefit. Using PRN Ventolin - hasn't required in weeks.   States she has noticed dairy causes worsened resp symptoms - states has been avoiding lately.   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   Patient Instructions   Expect asthma symptoms to flare.  Can use Trelegy once per day and Albuterol as needed.  Take Prednisone as prescribed ONLY as needed - use sparingly.  Chest xray standing order placed only to be done as needed.  Continue current prescription medication regiment. Keep follow up appointment as scheduled. Please call the office if you have any questions or concerns.         08/03/2023:  Denies being seen by prior  Pulmonologist. Denies current use of maintenance inhaler. Denies current use of supplemental oxygen, CPAP. Denies personal history of cancer. Does endorse history of recurrent DVT (2 total) - not on anticoagulation - states she uses homeopathic and lifestyle remedies.   Diagnosed with asthma in 1970's - has been well controlled overall. Currently using Albuterol PRN - states over the last two weeks has been requiring increased use, approx 4x per day with short lived benefit. Used to use Advair or Wixella in the past however has been out for approx 4 months.  Cough is persistent over the last two weeks - productive with clear mucous - associated with wheezing, chest tightness. Cough is worse at night time. Cough is associated with nocturnal arousals.   Shortness of breath is worsening over the last two weeks - not exertional, random occurrence.   Patient Instructions   Recommend maintenance inhaler at this time. Can start Trelegy - this is ONE PUFF ONCE PER DAY. This inhaler contains an inhaled steroid component. Rinse mouth after each use due to risk for thrush development. If mouth or tongue develops white sores please contact the clinic and I will order a prescription mouth wash.   Continue to use Albuterol as needed for shortness of breath, wheezing, chest tightness.  Prednisone - take as prescribed. Start this tomorrow.  Celestone injection given in office today.  Azithromycin - take only as needed for green/yellow mucous production.  Chest xray now to rule out pneumonia.  Continue current prescription medication regiment. Keep follow up appointment as scheduled. Please call the office if you have any questions or concerns.             Social Hx: Lives with roommates - no animals in the home. 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 Asbestosis exposure, Smoking Hx: Never smoker   Family Hx: No Lung Cancer, No COPD, No Asthma  Medical Hx: No previous pneumonia ; No previous shoulder/chest  "surgery        The Chief Complaint varies with instability at times.     PFSH:  Past Medical History:   Diagnosis Date    Asthma     Diabetes mellitus     General anesthetics causing adverse effect in therapeutic use     shallow breather    Nasal polyps     bilateral         Past Surgical History:   Procedure Laterality Date    HYSTERECTOMY      KNEE ARTHROSCOPY      right    NASAL POLYP SURGERY      three times    PELVIC LAPAROSCOPY      ROTATOR CUFF REPAIR Right     TONSILLECTOMY       Social History     Tobacco Use    Smoking status: Never    Smokeless tobacco: Never   Substance Use Topics    Alcohol use: No    Drug use: Yes     Types: Hydrocodone     No family history on file.  Review of patient's allergies indicates:   Allergen Reactions    Aspirin Anaphylaxis    Nsaids (non-steroidal anti-inflammatory drug) Anaphylaxis    Penicillins Hives and Itching    Codeine Nausea Only         I have reviewed past medical, family, and social history.     Performance Status:The patient's activity level is functions out of house.        Review of Systems:  a review of eleven systems covering constitutional, Eye, HEENT, Psych, Respiratory, Cardiac, GI, , Musculoskeletal, Endocrine, Dermatologic was negative except for pertinent findings as listed ABOVE and below: pertinent positive as above, rest is good       Exam:Comprehensive exam done. /75 (BP Location: Left arm, Patient Position: Sitting, BP Method: Medium (Automatic))   Pulse 97   Ht 5' 7" (1.702 m)   Wt 67.7 kg (149 lb 2.3 oz)   SpO2 95% Comment: on room air at rest  BMI 23.36 kg/m²   Exam included Vitals as listed  Constitutional: She is oriented to person, place, and time. She appears well-developed. No distress.   Nose: Nose normal.   Mouth/Throat: Uvula is midline, oropharynx is clear and moist and mucous membranes are normal. No dental caries. No oropharyngeal exudate, posterior oropharyngeal edema, posterior oropharyngeal erythema or tonsillar " "abscesses.    Eyes: Pupils are equal, round, and reactive to light.   Neck: No JVD present. No thyromegaly present.   Cardiovascular: Normal rate, regular rhythm and normal heart sounds. Exam reveals no gallop and no friction rub.   No murmur heard.  Pulmonary/Chest: Effort normal and breath sounds normal. No accessory muscle usage or stridor. No apnea and no tachypnea. No respiratory distress, decreased breath sounds, wheezes, rhonchi, rales, or tenderness. Expiratory wheezing throughout all lung fields, rhonchi to bilateral lower lungs. On room air, in no acute distress.   Abdominal: Soft. She exhibits no mass. There is no tenderness. No hepatosplenomegaly, hernias and normoactive bowel sounds  Musculoskeletal: Normal range of motion. +4 pitting edema noted to BLE.  Neurological:  alert and oriented to person, place, and time. not disoriented.   Skin: Skin is warm and dry. Capillary refill takes less 2 sec. No cyanosis or erythema. No pallor. Nails show no clubbing.   Psychiatric: normal mood and affect. behavior is normal. Judgment and thought content normal.       Radiographs (ct chest and cxr) reviewed: was done   Patient imaging studies were reviewed and interpreted independently. My personal interpretation of most recent images include:  CXR 8/3/2023 Clear  CXR - 2013 - Lungs with no acute process.        Labs Patient's labs were reviewed including CBC and CMP  was not done     No results found for: "WBC", "RBC", "HGB", "HCT", "MCV", "MCH", "MCHC", "RDW", "PLT", "MPV", "GRAN", "LYMPH", "MONO", "EOS", "BASO", "EOSINOPHIL", "BASOPHIL"      PFT was not done  Pulmonary Functions Testing Results:        Plan:  Clinical impression is resonably certain and repeated evaluation prn +/- follow up will be needed as below.    Aida was seen today for 3m f/u, wheezing and shortness of breath.    Diagnoses and all orders for this visit:    Mild intermittent asthma with acute exacerbation  -     predniSONE (DELTASONE) 20 MG " tablet; Take one pill per day for three days as needed for shortness of breath, wheezing, cough. Repeat as needed.  -     fluticasone-umeclidin-vilanter (TRELEGY ELLIPTA) 200-62.5-25 mcg inhaler; Inhale 1 puff into the lungs once daily.  -     albuterol (PROVENTIL/VENTOLIN HFA) 90 mcg/actuation inhaler; Inhale 2 puffs into the lungs every 6 (six) hours as needed for Wheezing or Shortness of Breath. Rescue  -     X-Ray Chest PA And Lateral; Future  -     Complete PFT with bronchodilator; Future    Edema of both lower extremities  -     CBC auto differential; Future  -     Comprehensive Metabolic Panel; Future  -     LIPID PANEL; Future  -     TSH; Future  -     HEMOGLOBIN A1C; Future  -     X-Ray Chest PA And Lateral; Future  -     Echo; Future  -     B-TYPE NATRIURETIC PEPTIDE; Future    Shortness of breath  -     Echo; Future  -     HME - OTHER    Orthopnea  -     Echo; Future    Cough, unspecified type    Encounter for screening for cardiovascular disorders  -     LIPID PANEL; Future    Other specified abnormal findings of blood chemistry  -     HEMOGLOBIN A1C; Future            Follow up in about 4 weeks (around 12/28/2023), or if symptoms worsen or fail to improve.    Discussed with patient above for education the following:      Patient Instructions   Overall your symptoms may be concerning for an asthma exacerbation.  I recommend you start Trelegy once per day.  You have [employer] Medicare and Medicaid, this inhaler should not be expensive for you to purchase from the pharmacy.    Continue albuterol as needed for shortness of breath, wheezing, cough.    Prednisone - take as prescribed.  Use sparingly.    There is concern that your symptoms may be secondary to hypervolemia secondary to possible heart failure.  You have significant leg edema in office today.    I recommend checking a chest x-ray today.    I have ordered an echocardiogram to be done to evaluate your heart strength.    Check blood work.  This is to  include a CBC, BNP, CMP, hemoglobin A1c, lipid panel, TSH.    Will likely prescribe Lasix - will need results of blood work prior to.    Continue current prescription medication regiment. Keep follow up appointment as scheduled. Please call the office if you have any questions or concerns.

## 2023-12-01 DIAGNOSIS — R60.0 EDEMA OF BOTH LOWER EXTREMITIES: Primary | ICD-10-CM

## 2023-12-01 RX ORDER — FUROSEMIDE 20 MG/1
20 TABLET ORAL DAILY
Qty: 7 TABLET | Refills: 0 | Status: SHIPPED | OUTPATIENT
Start: 2023-12-01 | End: 2023-12-08

## 2024-02-26 ENCOUNTER — PATIENT MESSAGE (OUTPATIENT)
Dept: PULMONOLOGY | Facility: CLINIC | Age: 74
End: 2024-02-26
Payer: MEDICARE

## 2025-01-10 ENCOUNTER — TELEPHONE (OUTPATIENT)
Dept: PULMONOLOGY | Facility: CLINIC | Age: 75
End: 2025-01-10
Payer: MEDICARE

## 2025-01-10 ENCOUNTER — PATIENT MESSAGE (OUTPATIENT)
Dept: PULMONOLOGY | Facility: CLINIC | Age: 75
End: 2025-01-10
Payer: MEDICARE

## 2025-01-10 DIAGNOSIS — J45.21 MILD INTERMITTENT ASTHMA WITH ACUTE EXACERBATION: ICD-10-CM

## 2025-01-10 RX ORDER — ALBUTEROL SULFATE 90 UG/1
2 INHALANT RESPIRATORY (INHALATION) EVERY 6 HOURS PRN
Qty: 18 G | Refills: 0 | Status: SHIPPED | OUTPATIENT
Start: 2025-01-10

## 2025-01-10 NOTE — TELEPHONE ENCOUNTER
----- Message from Ethel sent at 1/10/2025  1:17 PM CST -----  Regarding: Refill request on vacation  Contact: pt  Type:  RX Refill Request    Who Called: pt    Refill or New Rx:refill    RX Name and Strength:albuterol (PROVENTIL/VENTOLIN HFA) 90 mcg/actuation inhale    How is the patient currently taking it? (ex. 1XDay):1/day    Is this a 30 day or 90 day RX:30    Preferred Pharmacy with phone number:  [facility]  [address] [address]  Phone: [phone] Fax: [phone]    Local or Mail Order:local    Ordering Provider:adithya    Would the patient rather a call back or a response [facility]? Call back    Best Call Back Number:[phone]

## 2025-06-05 ENCOUNTER — OFFICE VISIT (OUTPATIENT)
Dept: PULMONOLOGY | Facility: CLINIC | Age: 75
End: 2025-06-05
Payer: MEDICARE

## 2025-06-05 VITALS
HEART RATE: 94 BPM | WEIGHT: 148.13 LBS | HEIGHT: 67 IN | OXYGEN SATURATION: 94 % | SYSTOLIC BLOOD PRESSURE: 116 MMHG | BODY MASS INDEX: 23.25 KG/M2 | DIASTOLIC BLOOD PRESSURE: 70 MMHG

## 2025-06-05 DIAGNOSIS — J45.21 MILD INTERMITTENT ASTHMA WITH ACUTE EXACERBATION: Primary | ICD-10-CM

## 2025-06-05 DIAGNOSIS — Z00.00 PREVENTATIVE HEALTH CARE: ICD-10-CM

## 2025-06-05 PROCEDURE — 99999 PR PBB SHADOW E&M-EST. PATIENT-LVL III: CPT | Mod: PBBFAC,,, | Performed by: NURSE PRACTITIONER

## 2025-06-05 RX ORDER — ALBUTEROL SULFATE 90 UG/1
2 INHALANT RESPIRATORY (INHALATION) EVERY 6 HOURS PRN
Qty: 18 G | Refills: 11 | Status: SHIPPED | OUTPATIENT
Start: 2025-06-05

## 2025-06-05 RX ORDER — PREDNISONE 20 MG/1
TABLET ORAL
Qty: 21 TABLET | Refills: 0 | Status: SHIPPED | OUTPATIENT
Start: 2025-06-05

## 2025-06-05 RX ORDER — CLOBETASOL PROPIONATE 0.5 MG/G
OINTMENT TOPICAL
COMMUNITY
Start: 2025-05-12

## 2025-06-05 RX ORDER — FLUTICASONE FUROATE, UMECLIDINIUM BROMIDE AND VILANTEROL TRIFENATATE 200; 62.5; 25 UG/1; UG/1; UG/1
1 POWDER RESPIRATORY (INHALATION) DAILY
Qty: 60 EACH | Refills: 11 | Status: SHIPPED | OUTPATIENT
Start: 2025-06-05

## 2025-06-05 RX ORDER — FLUOCINOLONE ACETONIDE 0.11 MG/ML
OIL AURICULAR (OTIC)
COMMUNITY
Start: 2025-06-04

## 2025-06-24 ENCOUNTER — LAB VISIT (OUTPATIENT)
Dept: LAB | Facility: HOSPITAL | Age: 75
End: 2025-06-24
Attending: NURSE PRACTITIONER
Payer: MEDICARE

## 2025-06-24 DIAGNOSIS — Z00.00 PREVENTATIVE HEALTH CARE: ICD-10-CM

## 2025-06-24 LAB
ABSOLUTE EOSINOPHIL (OHS): 1.44 K/UL
ABSOLUTE MONOCYTE (OHS): 0.5 K/UL (ref 0.3–1)
ABSOLUTE NEUTROPHIL COUNT (OHS): 2.93 K/UL (ref 1.8–7.7)
ALBUMIN SERPL BCP-MCNC: 4 G/DL (ref 3.5–5.2)
ALP SERPL-CCNC: 77 UNIT/L (ref 40–150)
ALT SERPL W/O P-5'-P-CCNC: 15 UNIT/L (ref 10–44)
ANION GAP (OHS): 8 MMOL/L (ref 8–16)
AST SERPL-CCNC: 12 UNIT/L (ref 11–45)
BASOPHILS # BLD AUTO: 0.08 K/UL
BASOPHILS NFR BLD AUTO: 1.3 %
BILIRUB SERPL-MCNC: 0.5 MG/DL (ref 0.1–1)
BUN SERPL-MCNC: 7 MG/DL (ref 8–23)
CALCIUM SERPL-MCNC: 9.2 MG/DL (ref 8.7–10.5)
CHLORIDE SERPL-SCNC: 106 MMOL/L (ref 95–110)
CHOLEST SERPL-MCNC: 171 MG/DL (ref 120–199)
CHOLEST/HDLC SERPL: 2.4 {RATIO} (ref 2–5)
CO2 SERPL-SCNC: 26 MMOL/L (ref 23–29)
CREAT SERPL-MCNC: 0.7 MG/DL (ref 0.5–1.4)
EAG (OHS): 177 MG/DL (ref 68–131)
ERYTHROCYTE [DISTWIDTH] IN BLOOD BY AUTOMATED COUNT: 11.9 % (ref 11.5–14.5)
GFR SERPLBLD CREATININE-BSD FMLA CKD-EPI: >60 ML/MIN/1.73/M2
GLUCOSE SERPL-MCNC: 187 MG/DL (ref 70–110)
HBA1C MFR BLD: 7.8 % (ref 4–5.6)
HCT VFR BLD AUTO: 45.1 % (ref 37–48.5)
HDLC SERPL-MCNC: 70 MG/DL (ref 40–75)
HDLC SERPL: 40.9 % (ref 20–50)
HGB BLD-MCNC: 14.5 GM/DL (ref 12–16)
IMM GRANULOCYTES # BLD AUTO: 0.01 K/UL (ref 0–0.04)
IMM GRANULOCYTES NFR BLD AUTO: 0.2 % (ref 0–0.5)
LDLC SERPL CALC-MCNC: 88.8 MG/DL (ref 63–159)
LYMPHOCYTES # BLD AUTO: 1.22 K/UL (ref 1–4.8)
MCH RBC QN AUTO: 30.9 PG (ref 27–31)
MCHC RBC AUTO-ENTMCNC: 32.2 G/DL (ref 32–36)
MCV RBC AUTO: 96 FL (ref 82–98)
NONHDLC SERPL-MCNC: 101 MG/DL
NUCLEATED RBC (/100WBC) (OHS): 0 /100 WBC
PLATELET # BLD AUTO: 338 K/UL (ref 150–450)
PMV BLD AUTO: 10.1 FL (ref 9.2–12.9)
POTASSIUM SERPL-SCNC: 4.2 MMOL/L (ref 3.5–5.1)
PROT SERPL-MCNC: 6.7 GM/DL (ref 6–8.4)
RBC # BLD AUTO: 4.7 M/UL (ref 4–5.4)
RELATIVE EOSINOPHIL (OHS): 23.3 %
RELATIVE LYMPHOCYTE (OHS): 19.7 % (ref 18–48)
RELATIVE MONOCYTE (OHS): 8.1 % (ref 4–15)
RELATIVE NEUTROPHIL (OHS): 47.4 % (ref 38–73)
SODIUM SERPL-SCNC: 140 MMOL/L (ref 136–145)
TRIGL SERPL-MCNC: 61 MG/DL (ref 30–150)
TSH SERPL-ACNC: 2.33 UIU/ML (ref 0.4–4)
WBC # BLD AUTO: 6.18 K/UL (ref 3.9–12.7)

## 2025-06-24 PROCEDURE — 82465 ASSAY BLD/SERUM CHOLESTEROL: CPT

## 2025-06-24 PROCEDURE — 84443 ASSAY THYROID STIM HORMONE: CPT

## 2025-06-24 PROCEDURE — 83036 HEMOGLOBIN GLYCOSYLATED A1C: CPT

## 2025-06-24 PROCEDURE — 85025 COMPLETE CBC W/AUTO DIFF WBC: CPT

## 2025-06-24 PROCEDURE — 80053 COMPREHEN METABOLIC PANEL: CPT

## 2025-06-24 PROCEDURE — 36415 COLL VENOUS BLD VENIPUNCTURE: CPT | Mod: PN

## 2025-06-26 ENCOUNTER — RESULTS FOLLOW-UP (OUTPATIENT)
Dept: PULMONOLOGY | Facility: CLINIC | Age: 75
End: 2025-06-26

## (undated) DEVICE — SOL IRR NACL .9% 3000ML

## (undated) DEVICE — GUIDEWIRE UROLOGY .038X150CM

## (undated) DEVICE — SEE MEDLINE ITEM 152622

## (undated) DEVICE — SPONGE GAUZE 16PLY 4X4

## (undated) DEVICE — SYR 10CC LUER LOCK

## (undated) DEVICE — SET TUR BLADDER IRRIG Y TYPE

## (undated) DEVICE — SEE MEDLINE ITEM 154981

## (undated) DEVICE — GLOVE BIOGEL ECLIPSE SZ 7.5

## (undated) DEVICE — WIRE GD LUB STD 3CM .035 150CM

## (undated) DEVICE — NEPTUNE 4 PORT MANIFOLD

## (undated) DEVICE — EXTRACTOR TIPLESS 2.4FRX1115CM

## (undated) DEVICE — Device

## (undated) DEVICE — SEE MEDLINE ITEM 157128

## (undated) DEVICE — COVER LIGHT HANDLE 80/CA

## (undated) DEVICE — CONTAINER SPECIMEN STRL 4OZ

## (undated) DEVICE — BAG URO DRAIN

## (undated) DEVICE — CATH URTRL OPEN END STR TIP 5F

## (undated) DEVICE — SEE MEDLINE ITEM 152487

## (undated) DEVICE — GUIDE WIRE MOTION .035 X 150CM

## (undated) DEVICE — GOWN SURGICAL X-LARGE

## (undated) DEVICE — PACK CYSTO

## (undated) DEVICE — DRAPE C ARM 42 X 120 10/BX